# Patient Record
Sex: MALE | Race: WHITE
[De-identification: names, ages, dates, MRNs, and addresses within clinical notes are randomized per-mention and may not be internally consistent; named-entity substitution may affect disease eponyms.]

---

## 2020-01-15 ENCOUNTER — TRANSCRIPTION ENCOUNTER (OUTPATIENT)
Age: 70
End: 2020-01-15

## 2020-01-15 ENCOUNTER — RESULT REVIEW (OUTPATIENT)
Age: 70
End: 2020-01-15

## 2020-01-15 ENCOUNTER — OUTPATIENT (OUTPATIENT)
Dept: OUTPATIENT SERVICES | Facility: HOSPITAL | Age: 70
LOS: 1 days | Discharge: HOME | End: 2020-01-15

## 2020-01-15 VITALS — RESPIRATION RATE: 17 BRPM | HEART RATE: 94 BPM | DIASTOLIC BLOOD PRESSURE: 75 MMHG | SYSTOLIC BLOOD PRESSURE: 119 MMHG

## 2020-01-15 VITALS
TEMPERATURE: 98 F | RESPIRATION RATE: 18 BRPM | DIASTOLIC BLOOD PRESSURE: 82 MMHG | HEART RATE: 108 BPM | SYSTOLIC BLOOD PRESSURE: 129 MMHG | WEIGHT: 147.05 LBS | HEIGHT: 64 IN

## 2020-01-15 DIAGNOSIS — K51.80 OTHER ULCERATIVE COLITIS WITHOUT COMPLICATIONS: ICD-10-CM

## 2020-01-15 DIAGNOSIS — Z87.2 PERSONAL HISTORY OF DISEASES OF THE SKIN AND SUBCUTANEOUS TISSUE: Chronic | ICD-10-CM

## 2020-01-15 DIAGNOSIS — Z90.49 ACQUIRED ABSENCE OF OTHER SPECIFIED PARTS OF DIGESTIVE TRACT: Chronic | ICD-10-CM

## 2020-01-20 LAB — SURGICAL PATHOLOGY STUDY: SIGNIFICANT CHANGE UP

## 2020-01-23 DIAGNOSIS — K52.9 NONINFECTIVE GASTROENTERITIS AND COLITIS, UNSPECIFIED: ICD-10-CM

## 2020-01-23 DIAGNOSIS — R19.7 DIARRHEA, UNSPECIFIED: ICD-10-CM

## 2020-01-23 DIAGNOSIS — K64.1 SECOND DEGREE HEMORRHOIDS: ICD-10-CM

## 2020-01-23 DIAGNOSIS — Z90.49 ACQUIRED ABSENCE OF OTHER SPECIFIED PARTS OF DIGESTIVE TRACT: ICD-10-CM

## 2020-01-23 DIAGNOSIS — D12.4 BENIGN NEOPLASM OF DESCENDING COLON: ICD-10-CM

## 2020-01-23 DIAGNOSIS — I10 ESSENTIAL (PRIMARY) HYPERTENSION: ICD-10-CM

## 2022-05-11 PROBLEM — I10 ESSENTIAL (PRIMARY) HYPERTENSION: Chronic | Status: ACTIVE | Noted: 2020-01-15

## 2022-05-11 PROBLEM — K21.9 GASTRO-ESOPHAGEAL REFLUX DISEASE WITHOUT ESOPHAGITIS: Chronic | Status: ACTIVE | Noted: 2020-01-15

## 2022-05-11 PROBLEM — K52.9 NONINFECTIVE GASTROENTERITIS AND COLITIS, UNSPECIFIED: Chronic | Status: ACTIVE | Noted: 2020-01-15

## 2022-05-18 ENCOUNTER — OUTPATIENT (OUTPATIENT)
Dept: OUTPATIENT SERVICES | Facility: HOSPITAL | Age: 72
LOS: 1 days | Discharge: HOME | End: 2022-05-18
Payer: COMMERCIAL

## 2022-05-18 DIAGNOSIS — R10.9 UNSPECIFIED ABDOMINAL PAIN: ICD-10-CM

## 2022-05-18 DIAGNOSIS — Z87.2 PERSONAL HISTORY OF DISEASES OF THE SKIN AND SUBCUTANEOUS TISSUE: Chronic | ICD-10-CM

## 2022-05-18 DIAGNOSIS — Z90.49 ACQUIRED ABSENCE OF OTHER SPECIFIED PARTS OF DIGESTIVE TRACT: Chronic | ICD-10-CM

## 2022-05-18 PROCEDURE — 74176 CT ABD & PELVIS W/O CONTRAST: CPT | Mod: 26

## 2022-11-14 ENCOUNTER — INPATIENT (INPATIENT)
Facility: HOSPITAL | Age: 72
LOS: 9 days | Discharge: HOME | End: 2022-11-24
Attending: HOSPITALIST | Admitting: HOSPITALIST

## 2022-11-14 VITALS
TEMPERATURE: 96 F | WEIGHT: 141.1 LBS | HEART RATE: 50 BPM | SYSTOLIC BLOOD PRESSURE: 206 MMHG | RESPIRATION RATE: 18 BRPM | OXYGEN SATURATION: 99 % | HEIGHT: 64 IN | DIASTOLIC BLOOD PRESSURE: 89 MMHG

## 2022-11-14 DIAGNOSIS — I16.1 HYPERTENSIVE EMERGENCY: ICD-10-CM

## 2022-11-14 DIAGNOSIS — Z87.2 PERSONAL HISTORY OF DISEASES OF THE SKIN AND SUBCUTANEOUS TISSUE: Chronic | ICD-10-CM

## 2022-11-14 DIAGNOSIS — I10 ESSENTIAL (PRIMARY) HYPERTENSION: ICD-10-CM

## 2022-11-14 DIAGNOSIS — Z90.49 ACQUIRED ABSENCE OF OTHER SPECIFIED PARTS OF DIGESTIVE TRACT: ICD-10-CM

## 2022-11-14 DIAGNOSIS — R74.01 ELEVATION OF LEVELS OF LIVER TRANSAMINASE LEVELS: ICD-10-CM

## 2022-11-14 DIAGNOSIS — E83.42 HYPOMAGNESEMIA: ICD-10-CM

## 2022-11-14 DIAGNOSIS — Z20.822 CONTACT WITH AND (SUSPECTED) EXPOSURE TO COVID-19: ICD-10-CM

## 2022-11-14 DIAGNOSIS — K21.9 GASTRO-ESOPHAGEAL REFLUX DISEASE WITHOUT ESOPHAGITIS: ICD-10-CM

## 2022-11-14 DIAGNOSIS — K51.90 ULCERATIVE COLITIS, UNSPECIFIED, WITHOUT COMPLICATIONS: ICD-10-CM

## 2022-11-14 DIAGNOSIS — K83.1 OBSTRUCTION OF BILE DUCT: ICD-10-CM

## 2022-11-14 DIAGNOSIS — Z90.49 ACQUIRED ABSENCE OF OTHER SPECIFIED PARTS OF DIGESTIVE TRACT: Chronic | ICD-10-CM

## 2022-11-14 LAB
ALBUMIN SERPL ELPH-MCNC: 3.9 G/DL — SIGNIFICANT CHANGE UP (ref 3.5–5.2)
ALBUMIN SERPL ELPH-MCNC: 4 G/DL — SIGNIFICANT CHANGE UP (ref 3.5–5.2)
ALP SERPL-CCNC: 449 U/L — HIGH (ref 30–115)
ALP SERPL-CCNC: 457 U/L — HIGH (ref 30–115)
ALT FLD-CCNC: 512 U/L — HIGH (ref 0–41)
ALT FLD-CCNC: 529 U/L — HIGH (ref 0–41)
ANION GAP SERPL CALC-SCNC: 10 MMOL/L — SIGNIFICANT CHANGE UP (ref 7–14)
AST SERPL-CCNC: 560 U/L — HIGH (ref 0–41)
AST SERPL-CCNC: 594 U/L — HIGH (ref 0–41)
BASOPHILS # BLD AUTO: 0.03 K/UL — SIGNIFICANT CHANGE UP (ref 0–0.2)
BASOPHILS NFR BLD AUTO: 0.4 % — SIGNIFICANT CHANGE UP (ref 0–1)
BILIRUB DIRECT SERPL-MCNC: 0.4 MG/DL — HIGH (ref 0–0.3)
BILIRUB INDIRECT FLD-MCNC: 1.6 MG/DL — HIGH (ref 0.2–1.2)
BILIRUB SERPL-MCNC: 1.9 MG/DL — HIGH (ref 0.2–1.2)
BILIRUB SERPL-MCNC: 2 MG/DL — HIGH (ref 0.2–1.2)
BUN SERPL-MCNC: 10 MG/DL — SIGNIFICANT CHANGE UP (ref 10–20)
CALCIUM SERPL-MCNC: 9.3 MG/DL — SIGNIFICANT CHANGE UP (ref 8.4–10.4)
CHLORIDE SERPL-SCNC: 99 MMOL/L — SIGNIFICANT CHANGE UP (ref 98–110)
CO2 SERPL-SCNC: 23 MMOL/L — SIGNIFICANT CHANGE UP (ref 17–32)
CREAT SERPL-MCNC: 1 MG/DL — SIGNIFICANT CHANGE UP (ref 0.7–1.5)
EGFR: 80 ML/MIN/1.73M2 — SIGNIFICANT CHANGE UP
EOSINOPHIL # BLD AUTO: 0.56 K/UL — SIGNIFICANT CHANGE UP (ref 0–0.7)
EOSINOPHIL NFR BLD AUTO: 6.9 % — SIGNIFICANT CHANGE UP (ref 0–8)
GLUCOSE SERPL-MCNC: 98 MG/DL — SIGNIFICANT CHANGE UP (ref 70–99)
HCT VFR BLD CALC: 47.1 % — SIGNIFICANT CHANGE UP (ref 42–52)
HGB BLD-MCNC: 16.6 G/DL — SIGNIFICANT CHANGE UP (ref 14–18)
IMM GRANULOCYTES NFR BLD AUTO: 0.5 % — HIGH (ref 0.1–0.3)
LACTATE SERPL-SCNC: 1.1 MMOL/L — SIGNIFICANT CHANGE UP (ref 0.7–2)
LIDOCAIN IGE QN: 57 U/L — SIGNIFICANT CHANGE UP (ref 7–60)
LYMPHOCYTES # BLD AUTO: 1.09 K/UL — LOW (ref 1.2–3.4)
LYMPHOCYTES # BLD AUTO: 13.4 % — LOW (ref 20.5–51.1)
MCHC RBC-ENTMCNC: 30.6 PG — SIGNIFICANT CHANGE UP (ref 27–31)
MCHC RBC-ENTMCNC: 35.2 G/DL — SIGNIFICANT CHANGE UP (ref 32–37)
MCV RBC AUTO: 86.9 FL — SIGNIFICANT CHANGE UP (ref 80–94)
MONOCYTES # BLD AUTO: 0.72 K/UL — HIGH (ref 0.1–0.6)
MONOCYTES NFR BLD AUTO: 8.9 % — SIGNIFICANT CHANGE UP (ref 1.7–9.3)
NEUTROPHILS # BLD AUTO: 5.68 K/UL — SIGNIFICANT CHANGE UP (ref 1.4–6.5)
NEUTROPHILS NFR BLD AUTO: 69.9 % — SIGNIFICANT CHANGE UP (ref 42.2–75.2)
NRBC # BLD: 0 /100 WBCS — SIGNIFICANT CHANGE UP (ref 0–0)
NT-PROBNP SERPL-SCNC: 248 PG/ML — SIGNIFICANT CHANGE UP (ref 0–300)
PLATELET # BLD AUTO: 258 K/UL — SIGNIFICANT CHANGE UP (ref 130–400)
POTASSIUM SERPL-MCNC: SIGNIFICANT CHANGE UP MMOL/L (ref 3.5–5)
POTASSIUM SERPL-SCNC: SIGNIFICANT CHANGE UP MMOL/L (ref 3.5–5)
PROT SERPL-MCNC: 6.9 G/DL — SIGNIFICANT CHANGE UP (ref 6–8)
PROT SERPL-MCNC: 7.6 G/DL — SIGNIFICANT CHANGE UP (ref 6–8)
RBC # BLD: 5.42 M/UL — SIGNIFICANT CHANGE UP (ref 4.7–6.1)
RBC # FLD: 13.4 % — SIGNIFICANT CHANGE UP (ref 11.5–14.5)
SARS-COV-2 RNA SPEC QL NAA+PROBE: SIGNIFICANT CHANGE UP
SODIUM SERPL-SCNC: 132 MMOL/L — LOW (ref 135–146)
TROPONIN T SERPL-MCNC: <0.01 NG/ML — SIGNIFICANT CHANGE UP
WBC # BLD: 8.12 K/UL — SIGNIFICANT CHANGE UP (ref 4.8–10.8)
WBC # FLD AUTO: 8.12 K/UL — SIGNIFICANT CHANGE UP (ref 4.8–10.8)

## 2022-11-14 PROCEDURE — 70450 CT HEAD/BRAIN W/O DYE: CPT | Mod: 26,MA

## 2022-11-14 PROCEDURE — 74177 CT ABD & PELVIS W/CONTRAST: CPT | Mod: 26,MA

## 2022-11-14 PROCEDURE — 99285 EMERGENCY DEPT VISIT HI MDM: CPT

## 2022-11-14 PROCEDURE — 93010 ELECTROCARDIOGRAM REPORT: CPT

## 2022-11-14 RX ORDER — SODIUM CHLORIDE 9 MG/ML
1000 INJECTION, SOLUTION INTRAVENOUS ONCE
Refills: 0 | Status: COMPLETED | OUTPATIENT
Start: 2022-11-14 | End: 2022-11-14

## 2022-11-14 RX ADMIN — SODIUM CHLORIDE 1000 MILLILITER(S): 9 INJECTION, SOLUTION INTRAVENOUS at 21:48

## 2022-11-14 NOTE — ED PROVIDER NOTE - NS ED ROS FT
Constitutional: (-) fever  Eyes/ENT: (-) blurry vision, (-) epistaxis  Cardiovascular: (-) chest pain, (-) syncope (+) elevated BP  Respiratory: (-) cough, (-) shortness of breath  Gastrointestinal: (-) abdominal pain (-) nausea (-) vomiting, (-) diarrhea  Musculoskeletal: (-) neck pain, (-) back pain, (-) joint pain  Integumentary: (-) rash, (-) edema  Neurological: (+) headache, (-) altered mental status  Psychiatric: (-) hallucinations  Allergic/Immunologic: (-) pruritus

## 2022-11-14 NOTE — ED PROVIDER NOTE - NS ED ATTENDING STATEMENT MOD
This was a shared visit with the JENNIFFER. I reviewed and verified the documentation and independently performed the documented:

## 2022-11-14 NOTE — ED PROVIDER NOTE - PRINCIPAL DIAGNOSIS
Transaminitis Complex Repair And Double Advancement Flap Text: The defect edges were debeveled with a #15 scalpel blade.  The primary defect was closed partially with a complex linear closure.  Given the location of the remaining defect, shape of the defect and the proximity to free margins a double advancement flap was deemed most appropriate for complete closure of the defect.  Using a sterile surgical marker, an appropriate advancement flap was drawn incorporating the defect and placing the expected incisions within the relaxed skin tension lines where possible.    The area thus outlined was incised deep to adipose tissue with a #15 scalpel blade.  The skin margins were undermined to an appropriate distance in all directions utilizing iris scissors.

## 2022-11-14 NOTE — ED ADULT TRIAGE NOTE - NSWEIGHTCALCTOOLDRUG_GEN_A_CORE
Nursing Note by Silvana Perry CMA at 01/15/18 10:33 AM     Author:  Silvana Perry CMA Service:  (none) Author Type:  Certified Medical Assistant     Filed:  01/15/18 10:33 AM Encounter Date:  1/15/2018 Status:  Signed     :  Silvana Perry CMA (Certified Medical Assistant)            If the provider orders any diagnostic testing at today's visit, the patient would prefer to be contacted by means of[LB1.1T] mom Veridiana's cell[LB1.1M].  If by phone their preferred phone number is[LB1.1T] [phone][LB1.1M] and it is[LB1.1T] ok[LB1.1M] to leave a detailed message on their voicemail or with a family member.  Patient is aware that if they are my chart active most of their test results will be auto released once the provider has viewed them.[LB1.1T]        Revision History        User Key Date/Time User Provider Type Action    > LB1.1 01/15/18 10:33 AM Silvana Perry CMA Certified Medical Assistant Sign    M - Manual, T - Template            
 used

## 2022-11-14 NOTE — ED PROVIDER NOTE - ATTENDING APP SHARED VISIT CONTRIBUTION OF CARE
72-year-old male history of hypertension ulcerative colitis, cholecystectomy, colon resection presenting for evaluation of hypertension.  States that he has noted intermittent high blood pressure since Saturday associated with headache.  Had 1 episode of nausea/vomiting.  No other acute complaints.  No abdominal pain.  Chronically ill appearing, mildly jaundiced non toxic. NCAT PERRLA EOMI neck supple non tender normal wob cta bl rrr abdomen s nt nd no rebound no guarding WWPx4 neuro non focal

## 2022-11-14 NOTE — ED PROVIDER NOTE - CLINICAL SUMMARY MEDICAL DECISION MAKING FREE TEXT BOX
72-year-old male history of hypertension ulcerative colitis, cholecystectomy, colon resection presenting for evaluation of hypertension.  States that he has noted intermittent high blood pressure since Saturday associated with headache.  Had 1 episode of nausea/vomiting.  No other acute complaints.  No abdominal pain.  Chronically ill appearing, mildly jaundiced non toxic. NCAT PERRLA EOMI neck supple non tender normal wob cta bl rrr abdomen s nt nd no rebound no guarding WWPx4 neuro non focal. labs ekg imaging reviewed. afsane[facility] GI, will admit for further eval

## 2022-11-14 NOTE — ED PROVIDER NOTE - OBJECTIVE STATEMENT
72 year old male with a history of HTN and Ulcerative colitis presents with elevated blood pressure. Patient states that he is on Metoprolol 25mg BID and Quinapril 20mg BID which he is compliant with. Since Saturday evening his blood pressure has been elevated with SBP 170s associated with headache, nausea, and vomiting. At this time patient has mild headache but he has not vomited since Saturday night and is not nauseous at this time. Further denies vision changes, chest pain, shortness of breath, abdominal pain.

## 2022-11-15 LAB
ALBUMIN SERPL ELPH-MCNC: 3.9 G/DL — SIGNIFICANT CHANGE UP (ref 3.5–5.2)
ALP SERPL-CCNC: 553 U/L — HIGH (ref 30–115)
ALT FLD-CCNC: 500 U/L — HIGH (ref 0–41)
ANION GAP SERPL CALC-SCNC: 11 MMOL/L — SIGNIFICANT CHANGE UP (ref 7–14)
APTT BLD: 27.6 SEC — SIGNIFICANT CHANGE UP (ref 27–39.2)
AST SERPL-CCNC: 374 U/L — HIGH (ref 0–41)
BASOPHILS # BLD AUTO: 0.03 K/UL — SIGNIFICANT CHANGE UP (ref 0–0.2)
BASOPHILS NFR BLD AUTO: 0.4 % — SIGNIFICANT CHANGE UP (ref 0–1)
BILIRUB SERPL-MCNC: 2.2 MG/DL — HIGH (ref 0.2–1.2)
BUN SERPL-MCNC: 9 MG/DL — LOW (ref 10–20)
CALCIUM SERPL-MCNC: 9 MG/DL — SIGNIFICANT CHANGE UP (ref 8.4–10.5)
CHLORIDE SERPL-SCNC: 102 MMOL/L — SIGNIFICANT CHANGE UP (ref 98–110)
CO2 SERPL-SCNC: 26 MMOL/L — SIGNIFICANT CHANGE UP (ref 17–32)
CREAT SERPL-MCNC: 0.9 MG/DL — SIGNIFICANT CHANGE UP (ref 0.7–1.5)
EGFR: 91 ML/MIN/1.73M2 — SIGNIFICANT CHANGE UP
EOSINOPHIL # BLD AUTO: 0.51 K/UL — SIGNIFICANT CHANGE UP (ref 0–0.7)
EOSINOPHIL NFR BLD AUTO: 7.3 % — SIGNIFICANT CHANGE UP (ref 0–8)
GGT SERPL-CCNC: 431 U/L — HIGH (ref 1–40)
GLUCOSE SERPL-MCNC: 119 MG/DL — HIGH (ref 70–99)
HCT VFR BLD CALC: 45.7 % — SIGNIFICANT CHANGE UP (ref 42–52)
HGB BLD-MCNC: 15.7 G/DL — SIGNIFICANT CHANGE UP (ref 14–18)
IMM GRANULOCYTES NFR BLD AUTO: 0.4 % — HIGH (ref 0.1–0.3)
INR BLD: 1.03 RATIO — SIGNIFICANT CHANGE UP (ref 0.65–1.3)
LYMPHOCYTES # BLD AUTO: 0.95 K/UL — LOW (ref 1.2–3.4)
LYMPHOCYTES # BLD AUTO: 13.5 % — LOW (ref 20.5–51.1)
MAGNESIUM SERPL-MCNC: 1.8 MG/DL — SIGNIFICANT CHANGE UP (ref 1.8–2.4)
MCHC RBC-ENTMCNC: 30.4 PG — SIGNIFICANT CHANGE UP (ref 27–31)
MCHC RBC-ENTMCNC: 34.4 G/DL — SIGNIFICANT CHANGE UP (ref 32–37)
MCV RBC AUTO: 88.4 FL — SIGNIFICANT CHANGE UP (ref 80–94)
MONOCYTES # BLD AUTO: 0.64 K/UL — HIGH (ref 0.1–0.6)
MONOCYTES NFR BLD AUTO: 9.1 % — SIGNIFICANT CHANGE UP (ref 1.7–9.3)
NEUTROPHILS # BLD AUTO: 4.87 K/UL — SIGNIFICANT CHANGE UP (ref 1.4–6.5)
NEUTROPHILS NFR BLD AUTO: 69.3 % — SIGNIFICANT CHANGE UP (ref 42.2–75.2)
NRBC # BLD: 0 /100 WBCS — SIGNIFICANT CHANGE UP (ref 0–0)
PLATELET # BLD AUTO: 268 K/UL — SIGNIFICANT CHANGE UP (ref 130–400)
POTASSIUM SERPL-MCNC: 3.3 MMOL/L — LOW (ref 3.5–5)
POTASSIUM SERPL-SCNC: 3.3 MMOL/L — LOW (ref 3.5–5)
PROT SERPL-MCNC: 6.9 G/DL — SIGNIFICANT CHANGE UP (ref 6–8)
PROTHROM AB SERPL-ACNC: 11.7 SEC — SIGNIFICANT CHANGE UP (ref 9.95–12.87)
RBC # BLD: 5.17 M/UL — SIGNIFICANT CHANGE UP (ref 4.7–6.1)
RBC # FLD: 13.2 % — SIGNIFICANT CHANGE UP (ref 11.5–14.5)
SODIUM SERPL-SCNC: 139 MMOL/L — SIGNIFICANT CHANGE UP (ref 135–146)
WBC # BLD: 7.03 K/UL — SIGNIFICANT CHANGE UP (ref 4.8–10.8)
WBC # FLD AUTO: 7.03 K/UL — SIGNIFICANT CHANGE UP (ref 4.8–10.8)

## 2022-11-15 PROCEDURE — 76705 ECHO EXAM OF ABDOMEN: CPT | Mod: 26

## 2022-11-15 PROCEDURE — 99233 SBSQ HOSP IP/OBS HIGH 50: CPT

## 2022-11-15 PROCEDURE — 99221 1ST HOSP IP/OBS SF/LOW 40: CPT

## 2022-11-15 PROCEDURE — 99223 1ST HOSP IP/OBS HIGH 75: CPT

## 2022-11-15 RX ORDER — PANTOPRAZOLE SODIUM 20 MG/1
40 TABLET, DELAYED RELEASE ORAL
Refills: 0 | Status: DISCONTINUED | OUTPATIENT
Start: 2022-11-15 | End: 2022-11-24

## 2022-11-15 RX ORDER — MESALAMINE 400 MG
2 TABLET, DELAYED RELEASE (ENTERIC COATED) ORAL
Qty: 0 | Refills: 0 | DISCHARGE

## 2022-11-15 RX ORDER — ENOXAPARIN SODIUM 100 MG/ML
40 INJECTION SUBCUTANEOUS ONCE
Refills: 0 | Status: COMPLETED | OUTPATIENT
Start: 2022-11-15 | End: 2022-11-15

## 2022-11-15 RX ORDER — MESALAMINE 400 MG
4800 TABLET, DELAYED RELEASE (ENTERIC COATED) ORAL DAILY
Refills: 0 | Status: DISCONTINUED | OUTPATIENT
Start: 2022-11-15 | End: 2022-11-16

## 2022-11-15 RX ORDER — POTASSIUM CHLORIDE 20 MEQ
20 PACKET (EA) ORAL
Refills: 0 | Status: COMPLETED | OUTPATIENT
Start: 2022-11-15 | End: 2022-11-15

## 2022-11-15 RX ORDER — AZATHIOPRINE 100 MG/1
50 TABLET ORAL DAILY
Refills: 0 | Status: DISCONTINUED | OUTPATIENT
Start: 2022-11-15 | End: 2022-11-24

## 2022-11-15 RX ORDER — HYDRALAZINE HCL 50 MG
25 TABLET ORAL ONCE
Refills: 0 | Status: COMPLETED | OUTPATIENT
Start: 2022-11-15 | End: 2022-11-15

## 2022-11-15 RX ORDER — ESCITALOPRAM OXALATE 10 MG/1
10 TABLET, FILM COATED ORAL DAILY
Refills: 0 | Status: DISCONTINUED | OUTPATIENT
Start: 2022-11-15 | End: 2022-11-24

## 2022-11-15 RX ORDER — ATORVASTATIN CALCIUM 80 MG/1
20 TABLET, FILM COATED ORAL AT BEDTIME
Refills: 0 | Status: DISCONTINUED | OUTPATIENT
Start: 2022-11-15 | End: 2022-11-24

## 2022-11-15 RX ORDER — METOPROLOL TARTRATE 50 MG
25 TABLET ORAL DAILY
Refills: 0 | Status: DISCONTINUED | OUTPATIENT
Start: 2022-11-15 | End: 2022-11-24

## 2022-11-15 RX ORDER — LISINOPRIL 2.5 MG/1
20 TABLET ORAL DAILY
Refills: 0 | Status: DISCONTINUED | OUTPATIENT
Start: 2022-11-15 | End: 2022-11-24

## 2022-11-15 RX ORDER — LABETALOL HCL 100 MG
10 TABLET ORAL ONCE
Refills: 0 | Status: COMPLETED | OUTPATIENT
Start: 2022-11-15 | End: 2022-11-15

## 2022-11-15 RX ADMIN — Medication 25 MILLIGRAM(S): at 21:47

## 2022-11-15 RX ADMIN — ATORVASTATIN CALCIUM 20 MILLIGRAM(S): 80 TABLET, FILM COATED ORAL at 21:33

## 2022-11-15 RX ADMIN — Medication 25 MILLIGRAM(S): at 05:43

## 2022-11-15 RX ADMIN — Medication 20 MILLIEQUIVALENT(S): at 15:34

## 2022-11-15 RX ADMIN — LISINOPRIL 20 MILLIGRAM(S): 2.5 TABLET ORAL at 05:40

## 2022-11-15 RX ADMIN — ENOXAPARIN SODIUM 40 MILLIGRAM(S): 100 INJECTION SUBCUTANEOUS at 05:43

## 2022-11-15 RX ADMIN — Medication 20 MILLIEQUIVALENT(S): at 17:35

## 2022-11-15 RX ADMIN — Medication 10 MILLIGRAM(S): at 17:48

## 2022-11-15 RX ADMIN — PANTOPRAZOLE SODIUM 40 MILLIGRAM(S): 20 TABLET, DELAYED RELEASE ORAL at 06:31

## 2022-11-15 RX ADMIN — Medication 20 MILLIEQUIVALENT(S): at 21:32

## 2022-11-15 NOTE — PATIENT PROFILE ADULT - NS PRO AD NO ADVANCE DIRECTIVE
SUBJECTIVE:  Virgilio Forbes is coming in for annual exam.    Virgilio is been having problems with his back.  He said whenever he is golfing his back tightens up and he gets pain down his right leg in a sciatic nerve distribution.  He seems to have very little other onset of his sciatic except when he is golfing.    He has history of hypertension and was concerned about his blood pressure.    He has a history of atherosclerotic heart disease based on a cardiac calcium score from 2016.  He is on statins and has had no chest pain or anginal equivalent symptoms.      I reviewed his medical, family, surgical and social history  I reviewed his meds.      He sleeping well.  He has had no fevers, sore throat, cough or congestion.    He has no nocturia.    He has no change in bowel pattern.    He has no visual change, headaches, anxiety or depressive symptoms    OBJECTIVE:  Vital signs reviewed.  Nursing notes reviewed.  Cardiac exam shows a regular rate and rhythm without murmur or gallop.  Lungs clear without wheezing or rales.  No carotid bruits.  Good pulses without edema lower extremities.  Alert awake oriented x3 without speech or motor impairment    ASSESSMENT:  1. Annual physical exam   2. Atherosclerotic heart disease  3. Lumbar disc disease L 5 S1     PLAN:  I think his sciatic pain is manageable at this point without further intervention.  Home core muscle strengthening discussed.    Lab work ordered I will call with results.    Strong recommendation to repeat his cardiac calcium score which is now 6 years old.    Colonoscopy discussed.    Immunotherapy reviewed    
Virgilio Forbes is a 51 year old male  Denies known Latex allergy or symptoms of Latex sensitivity.  Allergies reviewed.   Medications verified and changes made with Patient's assistance.  Prescription benefits verified.  Preferred Pharmacy verified.  Weight taken with shoes ON    Advanced Directives on file ? No      Tobacco History reviewed & updated with Pt.  Pt wearing mask: Yes   PPE worn during encounter -Surgical mask, Face shield.     Patient 's reason for appointment is: Blood Pressure (Check up )      Health Maintenance Due   Topic Date Due   • Colorectal Cancer Screen-  Never done   • Shingles Vaccine (1 of 2) Never done   • Depression Screening  07/24/2021   • COVID-19 Vaccine (4 - Booster for Pfizer series) 03/21/2022       Patient is due for the topics as listed above and wishes to proceed with them.   Patient would like communication of their results via:      Cell Phone:   Telephone Information:   Mobile [phone]     Okay to leave a message containing results? Yes        
No

## 2022-11-15 NOTE — CONSULT NOTE ADULT - NS ATTEND AMEND GEN_ALL_CORE FT
73 yo M elevated LFTs, in the setting of hypertensive crisis. Please obtain US and CLD work up. Rule out PSC in the setting of UC. Suggest OP MRCP

## 2022-11-15 NOTE — H&P ADULT - NSHPLABSRESULTS_GEN_ALL_CORE
16.6   8.12  )-----------( 258      ( 14 Nov 2022 20:17 )             47.1       11-14    132<L>  |  99  |  10  ----------------------------<  98  tnp   |  23  |  1.0    Ca    9.3      14 Nov 2022 20:17    TPro  6.9  /  Alb  4.0  /  TBili  2.0<H>  /  DBili  0.4<H>  /  AST  560<H>  /  ALT  512<H>  /  AlkPhos  457<H>  11-14

## 2022-11-15 NOTE — PROGRESS NOTE ADULT - SUBJECTIVE AND OBJECTIVE BOX
SUBJECTIVE:    Patient is a 72y old Male who presents with a chief complaint of   Currently admitted to medicine with the primary diagnosis of Transaminitis     Today is hospital day . This morning he is resting comfortably in bed and reports no new issues or overnight events.   still complaining of mild headache but has not vomited since admission to hospital.    PAST MEDICAL & SURGICAL HISTORY  HTN (hypertension)    GERD (gastroesophageal reflux disease)    Colitis    H/O pilonidal cyst    History of colon resection      SOCIAL HISTORY:  Negative for smoking/alcohol/drug use.     ALLERGIES:  No Known Allergies    MEDICATIONS:  STANDING MEDICATIONS  atorvastatin 20 milliGRAM(s) Oral at bedtime  azaTHIOprine 50 milliGRAM(s) Oral daily  escitalopram 10 milliGRAM(s) Oral daily  lisinopril 20 milliGRAM(s) Oral daily  mesalamine ER Capsule 4800 milliGRAM(s) Oral daily  metoprolol succinate ER 25 milliGRAM(s) Oral daily  pantoprazole    Tablet 40 milliGRAM(s) Oral before breakfast    PRN MEDICATIONS    VITALS:   T(F): 98.6  HR: 77  BP: 148/68  RR: 18  SpO2: 98%    LABS:                        15.7   7.03  )-----------( 268      ( 15 Nov 2022 12:14 )             45.7     11-15    139  |  102  |  9<L>  ----------------------------<  119<H>  3.3<L>   |  26  |  0.9    Ca    9.0      15 Nov 2022 12:14  Mg     1.8     11-15    TPro  6.9  /  Alb  3.9  /  TBili  2.2<H>  /  DBili  x   /  AST  374<H>  /  ALT  500<H>  /  AlkPhos  553<H>  11-15    PT/INR - ( 15 Nov 2022 12:14 )   PT: 11.70 sec;   INR: 1.03 ratio         PTT - ( 15 Nov 2022 12:14 )  PTT:27.6 sec      Lactate, Blood: 1.1 mmol/L (11-14-22 @ 21:39)  Troponin T, Serum: <0.01 ng/mL (11-14-22 @ 20:17)      CARDIAC MARKERS ( 14 Nov 2022 20:17 )  x     / <0.01 ng/mL / x     / x     / x          RADIOLOGY:    PHYSICAL EXAM:  GEN: No acute distress  LUNGS: Clear to auscultation bilaterally   HEART: Regular s1ss  ABD: Soft, non-tender, non-distended.  EXT: NC/NC/NE/2+PP/PRICE/Skin Intact.   NEURO: AAOX3    Intravenous access:   NG tube:   Morel Catheter:

## 2022-11-15 NOTE — H&P ADULT - HISTORY OF PRESENT ILLNESS
73 y/o M with HTN, ulcerative colitis, cholecystectomy, presents with elevated blood pressure. Two days ago pt started to have headaches and nausea/vomiting which he attributes to elevated blood pressure, states that highest it was at home was 160, wouldn't come down for two days, got worried and came to the ED. Pt attributes his n/v to elevated blood pressure, states that he never had abdominal pain, the n/vstopped yestrday.   Of note, pt states that he routinely gets abdominal ultrasounds by his doctor, most recent a few weeks ago and they told him he had some gallstones. Pt had a cholecystectomy many years ago.   Pt denies alcohol use.     In the ED, pt given fluids  Vitals: Vital Signs Last 24 Hrs  T(C): 36.4 (15 Nov 2022 02:28), Max: 36.9 (15 Nov 2022 00:38)  T(F): 97.5 (15 Nov 2022 02:28), Max: 98.5 (15 Nov 2022 00:38)  HR: 72 (15 Nov 2022 02:28) (50 - 73)  BP: 193/80 (15 Nov 2022 02:28) (154/76 - 206/89)  BP(mean): --  RR: 18 (15 Nov 2022 02:28) (18 - 20)  SpO2: 98% (15 Nov 2022 02:28) (98% - 99%)    Parameters below as of 15 Nov 2022 02:28  Patient On (Oxygen Delivery Method): room air    Labs: significant for , , T bili 2, D bili .4, alk phos 457    Imaging:   < from: CT Abdomen and Pelvis w/ IV Cont (11.14.22 @ 22:44) >  IMPRESSION:  1.  No evidence of acute abdominopelvic pathology.    < end of copied text >  < from: CT Head No Cont (11.14.22 @ 20:52) >  IMPRESSION:    1. No evidence of acute intracranial hemorrhage or mass effect.    2. Mild chronic microvascular ischemic changes.    < end of copied text >    Pt admitted for transaminitis

## 2022-11-15 NOTE — H&P ADULT - ATTENDING COMMENTS
***My note supersedes any discrepancies that may be above in the resident's note***    71 y/o M with HTN, ulcerative colitis, cholecystectomy, presents with symptomatic hypertension, concerning for hypertensive emergency    # HTN emergency  - severe headache with onset of HTN  - blood pressure resolved after receiving home meds  - unsure why BP accelerated as patient states he was compliant with his home medications, did not miss dose; no identifying aggrevating factors ie stress, uncontrolled pain, etc  -at home on quinapril 20mg; 1:1 conversion to lisinopril. will start lisinopril 20mg daily inpatient  -c/w metoprolol succinate 25mg daily  - monitor on serial vital checks  - if urgent/emergently elevated: labetalol 10mg IV(if HR >60) OR Hydralazine 10mg IV(if HR <60)    #Transaminitis  # choledocholithasis  - unsure etiology  - stones present in duct along with elevated alk phos, however, bili is mostly indirect  -, , T bili 2, D bili .4, alk phos 457  -did have n/v yesterday which resolved, never had abdominal pain, currently asymptomatic  -Pt had cholcystectomy many years ago, notes that he recently had outpatient US which showed gallstones  -CT abd negative  -RUQ sono pending  -Acute hepatitis panel  -Advanced GI eval pending    #Ulcerative colitis  #Hx of colonic resection  -Pt on mesalamine at home; awaiting wife to bring med list to confirm    MED REC: pt does not remember all his meds, verify with wife in am   DVT ppx: lovenox  GI ppx: ppi  Diet: dash  Dispo: from home    #Progress Note Handoff  Pending (specify):  BP monitoring, RUQ US, GI consult, LFT trends  Family discussion: updated patient at bedside  Disposition:  Unknown at this time________

## 2022-11-15 NOTE — H&P ADULT - NSICDXPASTMEDICALHX_GEN_ALL_CORE_FT
PAST MEDICAL HISTORY:  Colitis     GERD (gastroesophageal reflux disease)     HTN (hypertension)

## 2022-11-15 NOTE — CONSULT NOTE ADULT - ASSESSMENT
Patient is a 73 y/o male with PMHx of HTN, ulcerative colitis (, Prior Resection) , cholecystectomy, presents with elevated blood pressure. Patient noted started to have headaches and nausea/vomiting which he attributes to elevated blood pressure, states that highest it was at home was 160, wouldn't come down for two days, got worried and came to the ED. Patient feels better since admission. Notes no current pain and tolerating diet. Follows Dr. Mendoza and last CRC was in 2020, and inflammation was in the left colon. Exam reassuring, CT scan without acute intraabdominal process. All of the serologies ordered for 11am today are appropriate and we agree with. Obtain Abdomina U/S to evaluate CBD.     Abnormal LFT/ UC Hx  - Agree with serologies ordered for 11am today  - Obtain Abdominal U/S to check CBD  - Exam reassuring and BP better controlled   - Will follow  - Encouraged follow up with Dr. Mendoza upon discharge  Patient is a 71 y/o male with PMHx of HTN, ulcerative colitis (, Prior Resection) , cholecystectomy, presents with elevated blood pressure. Patient noted started to have headaches and nausea/vomiting which he attributes to elevated blood pressure, states that highest it was at home was 160, wouldn't come down for two days, got worried and came to the ED. Patient feels better since admission. Notes no current pain and tolerating diet. Follows Dr. Mendoza and last CRC was in 2020, and inflammation was in the left colon. Exam reassuring, CT scan without acute intraabdominal process. All of the serologies ordered for 11am today are appropriate and we agree with. Obtain Abdomina U/S to evaluate CBD.     Abnormal LFT/ UC Hx  - Agree with serologies ordered for 11am today  - Obtain Abdominal U/S to check CBD  - Exam reassuring and BP better controlled  - Check ANCA (Rule out PSC)  - CLD work up  - Will follow  - Encouraged follow up with Dr. Mendoza upon discharge

## 2022-11-15 NOTE — PROGRESS NOTE ADULT - ASSESSMENT
73 y/o M with HTN, ulcerative colitis, cholecystectomy, presents with elevated blood pressure. Found to have transaminitis    #Transaminitis - Cholestatic  # suspect retained stone   -, , T bili 2, D bili .4, alk phos 457  -did have n/v yesterday which resolved, never had abdominal pain, currently asymptomatic  -Pt had cholcystectomy many years ago, notes that he recently had outpatient US which showed gallstones  -CT abd no acute pathology  -US abdomen to assess for CBD dilation (pending)  - GI recs appreciated   > Acute hepatitis panel   > CLD work up (ordered)   >ANCA r/o PSC   > US abdomen to assess CBD size     #HTN crisis - better  -c/w lisinopril  -c/w metoprolol    #Ulcerative colitis  #Hx of colonic resection  -Pt does not remember meds, verify with wife in am     DVT ppx: lovenox  GI ppx: ppi  Diet: dash  Dispo: acute

## 2022-11-15 NOTE — H&P ADULT - ASSESSMENT
73 y/o M with HTN, ulcerative colitis, cholecystectomy, presents with elevated blood pressure. Found to have transaminitis    #Transaminitis, suspect retained stone   -, , T bili 2, D bili .4, alk phos 457  -did have n/v yesterday which resolved, never had abdominal pain, currently asymptomatic  -Pt had cholcystectomy many years ago, notes that he recently had outpatient US which showed gallstones  -CT abd negative  -RUQ sono  -Acute hepatitis panel  -Advanced GI eval     #HtN  -c/w lisinopril  -c/w metoprolol    #Ulcerative colitis  #Hx of colonic resection  -Pt does not remember meds, verify with wife in am     MED REC: pt does not remember all his meds, verify with wife in am   DVT ppx: lovenox  GI ppx: ppi  Diet: dash  Dispo: from home

## 2022-11-15 NOTE — H&P ADULT - NSHPPHYSICALEXAM_GEN_ALL_CORE
GENERAL: NAD, lying in bed comfortably  HEAD:  Atraumatic, Normocephalic  EYES: conjunctiva and sclera clear  ENT: Moist mucous membranes  NECK: Supple, No JVD  CHEST/LUNG: Clear to auscultation bilaterally; No rales, rhonchi, wheezing, or rubs. Unlabored respirations  HEART: Regular rate and rhythm; No murmurs, rubs, or gallops  ABDOMEN: Soft, nontender, nondistended  EXTREMITIES:  No clubbing, cyanosis, or edema  NERVOUS SYSTEM:  A&Ox3

## 2022-11-15 NOTE — CONSULT NOTE ADULT - SUBJECTIVE AND OBJECTIVE BOX
Patient is a 73 y/o male with PMHx of HTN, ulcerative colitis (, Prior Resection) , cholecystectomy, presents with elevated blood pressure. Patient noted started to have headaches and nausea/vomiting which he attributes to elevated blood pressure, states that highest it was at home was 160, wouldn't come down for two days, got worried and came to the ED. Patient feels better since admission. Notes no current pain and tolerating diet. Follows Dr. Mendoza and last CRC was in 2020, was on Mesalamine at the time. Due for follow up for UC. No episodes of emesis.       Parameters below as of 15 Nov 20  PAST MEDICAL & SURGICAL HISTORY:  HTN (hypertension)  GERD (gastroesophageal reflux disease)  Colitis  H/O pilonidal cyst  History of colon resection          MEDICATIONS  (STANDING):  lisinopril 20 milliGRAM(s) Oral daily  metoprolol succinate ER 25 milliGRAM(s) Oral daily  pantoprazole    Tablet 40 milliGRAM(s) Oral before breakfast    MEDICATIONS  (PRN):      Allergies  No Known Allergies        Review of Systems  General:  Denies Fatigue, Denies Fever, Denies Weakness ,Denies Weight Loss   HEENT: Denies Trouble Swallowing ,Denies  Sore Throat , Denies Change in hearing/vision/speech ,Denies Dizziness    Cardio: Denies  Chest Pain , Palpitations    Respiratory: Denies worsening of SOB, Denies Cough  Abdomen: See detailed HPI  Neuro: Denies Headache Denies Dizziness, Denies Paresthesias  MSK: Denies pain in Bones/Joints/Muscles   Psych: Patient denies depression, denies suicidal or homicidal ideations  Integ: Patient Denies rash, or new skin lesions       Vital Signs Last 24 Hrs  T(C): 36.4 (15 Nov 2022 02:28), Max: 36.9 (15 Nov 2022 00:38)  T(F): 97.5 (15 Nov 2022 02:28), Max: 98.5 (15 Nov 2022 00:38)  HR: 74 (15 Nov 2022 04:47) (50 - 74)  BP: 145/83 (15 Nov 2022 04:47) (145/83 - 206/89)  BP(mean): --  RR: 18 (15 Nov 2022 02:28) (18 - 20)  SpO2: 98% (15 Nov 2022 02:28) (98% - 99%)    Parameters below as of 15 Nov 2022 02:28  Patient On (Oxygen Delivery Method): room air    Physical Exam  Gen: NAD  HEENT: NC/AT, Mucosal Membranes  Cardio: S1/S2 No S3/S4, Regular  Resp: CTA B/L  Abdomen: Soft, ND/NT  Neuro: AAOx3, Cranial Nerve II-XII intact   Extremities: FROM x 4  Skin: No jaundice       Labs:                          16.6   8.12  )-----------( 258      ( 14 Nov 2022 20:17 )             47.1       Auto Neutrophil %: 69.9 % (11-14-22 @ 20:17)  Auto Immature Granulocyte %: 0.5 % (11-14-22 @ 20:17)    11-14    132<L>  |  99  |  10  ----------------------------<  98  tnp   |  23  |  1.0      Calcium, Total Serum: 9.3 mg/dL (11-14-22 @ 20:17)      LFTs:             6.9  | 2.0  | 560      ------------------[457     ( 14 Nov 2022 21:39 )  4.0  | 0.4  | 512         Lipase:57       Lactate, Blood: 1.1 mmol/L (11-14-22 @ 21:39)      CARDIAC MARKERS ( 14 Nov 2022 20:17 )  x     / <0.01 ng/mL / x     / x     / x          Serum Pro-Brain Natriuretic Peptide: 248 pg/mL (11-14-22 @ 20:17)    RADIOLOGY & ADDITIONAL STUDIES:  CT Abdomen and Pelvis w/ IV Cont 11.14.22   IMPRESSION:  1.  No evidence of acute abdominopelvic pathology.

## 2022-11-16 LAB
ALBUMIN SERPL ELPH-MCNC: 3.7 G/DL — SIGNIFICANT CHANGE UP (ref 3.5–5.2)
ALP SERPL-CCNC: 525 U/L — HIGH (ref 30–115)
ALT FLD-CCNC: 350 U/L — HIGH (ref 0–41)
ANION GAP SERPL CALC-SCNC: 11 MMOL/L — SIGNIFICANT CHANGE UP (ref 7–14)
AST SERPL-CCNC: 164 U/L — HIGH (ref 0–41)
BASOPHILS # BLD AUTO: 0.04 K/UL — SIGNIFICANT CHANGE UP (ref 0–0.2)
BASOPHILS NFR BLD AUTO: 0.5 % — SIGNIFICANT CHANGE UP (ref 0–1)
BILIRUB DIRECT SERPL-MCNC: 0.4 MG/DL — HIGH (ref 0–0.3)
BILIRUB SERPL-MCNC: 1.2 MG/DL — SIGNIFICANT CHANGE UP (ref 0.2–1.2)
BUN SERPL-MCNC: 11 MG/DL — SIGNIFICANT CHANGE UP (ref 10–20)
CALCIUM SERPL-MCNC: 9.2 MG/DL — SIGNIFICANT CHANGE UP (ref 8.4–10.5)
CHLORIDE SERPL-SCNC: 104 MMOL/L — SIGNIFICANT CHANGE UP (ref 98–110)
CO2 SERPL-SCNC: 27 MMOL/L — SIGNIFICANT CHANGE UP (ref 17–32)
CREAT SERPL-MCNC: 1 MG/DL — SIGNIFICANT CHANGE UP (ref 0.7–1.5)
EGFR: 80 ML/MIN/1.73M2 — SIGNIFICANT CHANGE UP
EOSINOPHIL # BLD AUTO: 0.55 K/UL — SIGNIFICANT CHANGE UP (ref 0–0.7)
EOSINOPHIL NFR BLD AUTO: 7 % — SIGNIFICANT CHANGE UP (ref 0–8)
FERRITIN SERPL-MCNC: 321 NG/ML — SIGNIFICANT CHANGE UP (ref 30–400)
GLUCOSE SERPL-MCNC: 112 MG/DL — HIGH (ref 70–99)
HAV IGM SER-ACNC: SIGNIFICANT CHANGE UP
HBV CORE IGM SER-ACNC: SIGNIFICANT CHANGE UP
HBV SURFACE AG SER-ACNC: SIGNIFICANT CHANGE UP
HCT VFR BLD CALC: 45.5 % — SIGNIFICANT CHANGE UP (ref 42–52)
HCV AB S/CO SERPL IA: 0.04 COI — SIGNIFICANT CHANGE UP
HCV AB S/CO SERPL IA: 0.29 S/CO — SIGNIFICANT CHANGE UP (ref 0–0.99)
HCV AB SERPL-IMP: SIGNIFICANT CHANGE UP
HCV AB SERPL-IMP: SIGNIFICANT CHANGE UP
HGB BLD-MCNC: 15.6 G/DL — SIGNIFICANT CHANGE UP (ref 14–18)
IMM GRANULOCYTES NFR BLD AUTO: 0.5 % — HIGH (ref 0.1–0.3)
IRON SATN MFR SERPL: 33 % — SIGNIFICANT CHANGE UP (ref 15–50)
IRON SATN MFR SERPL: 99 UG/DL — SIGNIFICANT CHANGE UP (ref 35–150)
LYMPHOCYTES # BLD AUTO: 1.73 K/UL — SIGNIFICANT CHANGE UP (ref 1.2–3.4)
LYMPHOCYTES # BLD AUTO: 22 % — SIGNIFICANT CHANGE UP (ref 20.5–51.1)
MAGNESIUM SERPL-MCNC: 1.8 MG/DL — SIGNIFICANT CHANGE UP (ref 1.8–2.4)
MCHC RBC-ENTMCNC: 30.5 PG — SIGNIFICANT CHANGE UP (ref 27–31)
MCHC RBC-ENTMCNC: 34.3 G/DL — SIGNIFICANT CHANGE UP (ref 32–37)
MCV RBC AUTO: 89 FL — SIGNIFICANT CHANGE UP (ref 80–94)
MONOCYTES # BLD AUTO: 0.74 K/UL — HIGH (ref 0.1–0.6)
MONOCYTES NFR BLD AUTO: 9.4 % — HIGH (ref 1.7–9.3)
NEUTROPHILS # BLD AUTO: 4.77 K/UL — SIGNIFICANT CHANGE UP (ref 1.4–6.5)
NEUTROPHILS NFR BLD AUTO: 60.6 % — SIGNIFICANT CHANGE UP (ref 42.2–75.2)
NRBC # BLD: 0 /100 WBCS — SIGNIFICANT CHANGE UP (ref 0–0)
PLATELET # BLD AUTO: 276 K/UL — SIGNIFICANT CHANGE UP (ref 130–400)
POTASSIUM SERPL-MCNC: 3.8 MMOL/L — SIGNIFICANT CHANGE UP (ref 3.5–5)
POTASSIUM SERPL-SCNC: 3.8 MMOL/L — SIGNIFICANT CHANGE UP (ref 3.5–5)
PROT SERPL-MCNC: 6.6 G/DL — SIGNIFICANT CHANGE UP (ref 6–8)
RBC # BLD: 5.11 M/UL — SIGNIFICANT CHANGE UP (ref 4.7–6.1)
RBC # FLD: 13.2 % — SIGNIFICANT CHANGE UP (ref 11.5–14.5)
SODIUM SERPL-SCNC: 142 MMOL/L — SIGNIFICANT CHANGE UP (ref 135–146)
TIBC SERPL-MCNC: 303 UG/DL — SIGNIFICANT CHANGE UP (ref 220–430)
TRANSFERRIN SERPL-MCNC: 228 MG/DL — SIGNIFICANT CHANGE UP (ref 200–360)
UIBC SERPL-MCNC: 204 UG/DL — SIGNIFICANT CHANGE UP (ref 110–370)
WBC # BLD: 7.87 K/UL — SIGNIFICANT CHANGE UP (ref 4.8–10.8)
WBC # FLD AUTO: 7.87 K/UL — SIGNIFICANT CHANGE UP (ref 4.8–10.8)

## 2022-11-16 PROCEDURE — 99233 SBSQ HOSP IP/OBS HIGH 50: CPT

## 2022-11-16 RX ORDER — ONDANSETRON 8 MG/1
4 TABLET, FILM COATED ORAL EVERY 8 HOURS
Refills: 0 | Status: DISCONTINUED | OUTPATIENT
Start: 2022-11-16 | End: 2022-11-24

## 2022-11-16 RX ORDER — MESALAMINE 400 MG
4500 TABLET, DELAYED RELEASE (ENTERIC COATED) ORAL DAILY
Refills: 0 | Status: DISCONTINUED | OUTPATIENT
Start: 2022-11-16 | End: 2022-11-16

## 2022-11-16 RX ORDER — MESALAMINE 400 MG
1000 TABLET, DELAYED RELEASE (ENTERIC COATED) ORAL
Refills: 0 | Status: DISCONTINUED | OUTPATIENT
Start: 2022-11-16 | End: 2022-11-24

## 2022-11-16 RX ORDER — HYDRALAZINE HCL 50 MG
25 TABLET ORAL EVERY 8 HOURS
Refills: 0 | Status: DISCONTINUED | OUTPATIENT
Start: 2022-11-16 | End: 2022-11-18

## 2022-11-16 RX ORDER — TRAZODONE HCL 50 MG
50 TABLET ORAL AT BEDTIME
Refills: 0 | Status: DISCONTINUED | OUTPATIENT
Start: 2022-11-16 | End: 2022-11-24

## 2022-11-16 RX ORDER — NIFEDIPINE 30 MG
30 TABLET, EXTENDED RELEASE 24 HR ORAL DAILY
Refills: 0 | Status: DISCONTINUED | OUTPATIENT
Start: 2022-11-16 | End: 2022-11-24

## 2022-11-16 RX ADMIN — AZATHIOPRINE 50 MILLIGRAM(S): 100 TABLET ORAL at 11:38

## 2022-11-16 RX ADMIN — ESCITALOPRAM OXALATE 10 MILLIGRAM(S): 10 TABLET, FILM COATED ORAL at 11:38

## 2022-11-16 RX ADMIN — Medication 25 MILLIGRAM(S): at 05:49

## 2022-11-16 RX ADMIN — PANTOPRAZOLE SODIUM 40 MILLIGRAM(S): 20 TABLET, DELAYED RELEASE ORAL at 05:49

## 2022-11-16 RX ADMIN — LISINOPRIL 20 MILLIGRAM(S): 2.5 TABLET ORAL at 05:49

## 2022-11-16 RX ADMIN — Medication 30 MILLIGRAM(S): at 11:38

## 2022-11-16 RX ADMIN — ATORVASTATIN CALCIUM 20 MILLIGRAM(S): 80 TABLET, FILM COATED ORAL at 21:52

## 2022-11-16 RX ADMIN — Medication 1000 MILLIGRAM(S): at 23:04

## 2022-11-16 NOTE — PROGRESS NOTE ADULT - SUBJECTIVE AND OBJECTIVE BOX
Gastroenterology progress note:     Patient is a 72y old  Male who presents with a chief complaint of      Admitted on: 11-15-22    We are following the patient for elevated LFT      Interval History:  Denies any abdominal pain, nausea, vomiting   never c/o abdominal pain          PAST MEDICAL & SURGICAL HISTORY:  HTN (hypertension)      GERD (gastroesophageal reflux disease)      Colitis      H/O pilonidal cyst      History of colon resection          MEDICATIONS  (STANDING):  atorvastatin 20 milliGRAM(s) Oral at bedtime  azaTHIOprine 50 milliGRAM(s) Oral daily  escitalopram 10 milliGRAM(s) Oral daily  lisinopril 20 milliGRAM(s) Oral daily  mesalamine ER Capsule 4500 milliGRAM(s) Oral daily  metoprolol succinate ER 25 milliGRAM(s) Oral daily  NIFEdipine XL 30 milliGRAM(s) Oral daily  pantoprazole    Tablet 40 milliGRAM(s) Oral before breakfast    MEDICATIONS  (PRN):  hydrALAZINE 25 milliGRAM(s) Oral every 8 hours PRN for SBP >180  ondansetron Injectable 4 milliGRAM(s) IV Push every 8 hours PRN Nausea and/or Vomiting  traZODone 50 milliGRAM(s) Oral at bedtime PRN Insomnia      Allergies  No Known Allergies      Review of Systems:   Cardiovascular:  No Chest Pain, No Palpitations  Respiratory:  No Cough, No Dyspnea  Gastrointestinal:  As described in HPI    Physical Examination:  T(C): 37.2 (11-16-22 @ 12:22), Max: 37.2 (11-16-22 @ 12:22)  HR: 84 (11-16-22 @ 12:22) (74 - 93)  BP: 158/75 (11-16-22 @ 12:22) (130/58 - 185/81)  RR: 19 (11-16-22 @ 12:22) (18 - 19)  SpO2: --      Constitutional: No acute distress.  Respiratory:  No signs of respiratory distress. Lung sounds are clear bilaterally.  Cardiovascular:  S1 S2, Regular rate and rhythm.  Abdominal: Abdomen is soft, symmetric, and non-tender without distention.      Data:                        15.6   7.87  )-----------( 276      ( 16 Nov 2022 05:36 )             45.5     Hgb trend:  15.6  11-16-22 @ 05:36  15.7  11-15-22 @ 12:14  16.6  11-14-22 @ 20:17      11-16    142  |  104  |  11  ----------------------------<  112<H>  3.8   |  27  |  1.0    Ca    9.2      16 Nov 2022 05:36  Mg     1.8     11-16    TPro  6.6  /  Alb  3.7  /  TBili  1.2  /  DBili  0.4<H>  /  AST  164<H>  /  ALT  350<H>  /  AlkPhos  525<H>  11-16    Liver panel trend:  TBili 1.2   /      /      /   AlkP 525   /   Tptn 6.6   /   Alb 3.7    /   DBili 0.4      11-16  TBili 2.2   /      /      /   AlkP 553   /   Tptn 6.9   /   Alb 3.9    /   DBili --      11-15  TBili 2.0   /      /      /   AlkP 457   /   Tptn 6.9   /   Alb 4.0    /   DBili 0.4      11-14  TBili 1.9   /      /      /   AlkP 449   /   Tptn 7.6   /   Alb 3.9    /   DBili --      11-14      PT/INR - ( 15 Nov 2022 12:14 )   PT: 11.70 sec;   INR: 1.03 ratio         PTT - ( 15 Nov 2022 12:14 )  PTT:27.6 sec       Radiology:    US Abdomen Upper Quadrant Right:   ACC: [id number] EXAM:  US ABDOMEN RT UPR QUADRANT                          PROCEDURE DATE:  11/15/2022          INTERPRETATION:  CLINICAL INFORMATION: Transaminitis    COMPARISON: CT abdomen pelvis 11/14/2022.    TECHNIQUE: Sonography of the right upper quadrant.    FINDINGS:    Liver: Within normal limits.  Bile ducts: Normal caliber. Common bile duct measures 0.4 cm.  Gallbladder: Status post cholecystectomy.  Pancreas: Poorly visualized.  Right kidney: 9.2 cm. No hydronephrosis.  Ascites: None.    IMPRESSION:  Status post cholecystectomy. Unremarkable ultrasound of the liver.        --- End of Report ---            RAFA ASHFORD MD; Attending Radiologist  This document has been electronically signed. Nov 16 2022  8:55AM (11-15-22 @ 19:52)

## 2022-11-16 NOTE — PROGRESS NOTE ADULT - ASSESSMENT
73 y/o M with HTN, ulcerative colitis, cholecystectomy, presents with elevated blood pressure. Found to have transaminitis    #Transaminitis - improving  # suspect retained stone   -, , T bili 2, D bili .4, alk phos 457  -did have n/v yesterday which resolved, never had abdominal pain, currently asymptomatic  -Pt had cholcystectomy many years ago, notes that he recently had outpatient US which showed gallstones  -CT abd no acute pathology  -US abdomen: No CBD dilation  - GI recs appreciated   > Acute hepatitis panel   > CLD work up (ordered)   > ANCA r/o PSC       #HTN crisis - better but still having few elevated readings  -c/w lisinopril  -c/w metoprolol  - Add nifedipin 30mg qd  - Hydralazine PRN if SBP>180    #Vomiting   - add zofran  - f/u with GI    #Ulcerative colitis  #Hx of colonic resection  -c/w azathioprine 50mg qd  - mesalamine ER 4.5g qd (patient's home dose is 4.8g qd. Since 1.2g pills are not available in hospital but 500mg are avalable the dose was adjusted to 4.5g qd)     DVT ppx: lovenox  GI ppx: ppi  Diet: dash  Dispo: acute

## 2022-11-16 NOTE — PROGRESS NOTE ADULT - SUBJECTIVE AND OBJECTIVE BOX
SUBJECTIVE:    Patient is a 72y old Male who presents with a chief complaint of   Currently admitted to medicine with the primary diagnosis of Transaminitis    Today is hospital day 1d. He reports no new issues or overnight events.   He vomited once today but denies any abdominal pain.      PAST MEDICAL & SURGICAL HISTORY  HTN (hypertension)    GERD (gastroesophageal reflux disease)    Colitis    H/O pilonidal cyst    History of colon resection      SOCIAL HISTORY:  Negative for smoking/alcohol/drug use.     ALLERGIES:  No Known Allergies    MEDICATIONS:  STANDING MEDICATIONS  atorvastatin 20 milliGRAM(s) Oral at bedtime  azaTHIOprine 50 milliGRAM(s) Oral daily  escitalopram 10 milliGRAM(s) Oral daily  lisinopril 20 milliGRAM(s) Oral daily  mesalamine ER Capsule 4500 milliGRAM(s) Oral daily  metoprolol succinate ER 25 milliGRAM(s) Oral daily  NIFEdipine XL 30 milliGRAM(s) Oral daily  pantoprazole    Tablet 40 milliGRAM(s) Oral before breakfast    PRN MEDICATIONS  hydrALAZINE 25 milliGRAM(s) Oral every 8 hours PRN  traZODone 50 milliGRAM(s) Oral at bedtime PRN    VITALS:   T(F): 99  HR: 84  BP: 158/75  RR: 19  SpO2: --    LABS:                        15.6   7.87  )-----------( 276      ( 16 Nov 2022 05:36 )             45.5     11-16    142  |  104  |  11  ----------------------------<  112<H>  3.8   |  27  |  1.0    Ca    9.2      16 Nov 2022 05:36  Mg     1.8     11-16    TPro  6.6  /  Alb  3.7  /  TBili  1.2  /  DBili  0.4<H>  /  AST  164<H>  /  ALT  350<H>  /  AlkPhos  525<H>  11-16    PT/INR - ( 15 Nov 2022 12:14 )   PT: 11.70 sec;   INR: 1.03 ratio         PTT - ( 15 Nov 2022 12:14 )  PTT:27.6 sec      CARDIAC MARKERS ( 14 Nov 2022 20:17 )  x     / <0.01 ng/mL / x     / x     / x          RADIOLOGY:    Sonography of the right upper quadrant.  FINDINGS:  Liver: Within normal limits.  Bile ducts: Normal caliber. Common bile duct measures 0.4 cm.  Gallbladder: Status post cholecystectomy.  Pancreas: Poorly visualized.  Right kidney: 9.2 cm. No hydronephrosis.  Ascites: None.  IMPRESSION:  Status post cholecystectomy. Unremarkable ultrasound of the liver.    PHYSICAL EXAM:  GEN: No acute distress  LUNGS: Clear to auscultation bilaterally   HEART: Regular S1S2  ABD: Soft, non-tender, non-distended.  EXT: No edema, +PP  NEURO: AAOX3    Intravenous access:   NG tube:   Morel Catheter:

## 2022-11-16 NOTE — PROGRESS NOTE ADULT - ASSESSMENT
71 y/o male with PMHx of HTN, ulcerative colitis (, Prior Resection) , cholecystectomy, presents with elevated blood pressure. Patient noted started to have headaches and nausea/vomiting which he attributes to elevated blood pressure, states that highest it was at home was 160, wouldn't come down for two days, got worried and came to the ED. Patient feels better since admission. Notes no current pain and tolerating diet. Follows Dr. Mendoza and last CRC was in 2020, and inflammation was in the left colon. Exam reassuring, CT scan without acute intraabdominal process. All of the serologies ordered for 11am today are appropriate and we agree with. Obtain Abdomina U/S to evaluate CBD.     #)Abnormal LFT/ UC Hx  -No baseline LFT to compare   -Trending down LFT   - US CBD 4mm, no stones   - Exam reassuring and BP better controlled    Recs:   - CLD work up pending   - Will follow

## 2022-11-17 LAB
ALBUMIN SERPL ELPH-MCNC: 3.8 G/DL — SIGNIFICANT CHANGE UP (ref 3.5–5.2)
ALBUMIN SERPL ELPH-MCNC: 3.9 G/DL — SIGNIFICANT CHANGE UP (ref 3.5–5.2)
ALP SERPL-CCNC: 668 U/L — HIGH (ref 30–115)
ALP SERPL-CCNC: 736 U/L — HIGH (ref 30–115)
ALT FLD-CCNC: 379 U/L — HIGH (ref 0–41)
ALT FLD-CCNC: 447 U/L — HIGH (ref 0–41)
ANION GAP SERPL CALC-SCNC: 10 MMOL/L — SIGNIFICANT CHANGE UP (ref 7–14)
ANION GAP SERPL CALC-SCNC: 10 MMOL/L — SIGNIFICANT CHANGE UP (ref 7–14)
AST SERPL-CCNC: 281 U/L — HIGH (ref 0–41)
AST SERPL-CCNC: 436 U/L — HIGH (ref 0–41)
BASOPHILS # BLD AUTO: 0.06 K/UL — SIGNIFICANT CHANGE UP (ref 0–0.2)
BASOPHILS NFR BLD AUTO: 0.7 % — SIGNIFICANT CHANGE UP (ref 0–1)
BILIRUB SERPL-MCNC: 1.4 MG/DL — HIGH (ref 0.2–1.2)
BILIRUB SERPL-MCNC: 4 MG/DL — HIGH (ref 0.2–1.2)
BUN SERPL-MCNC: 6 MG/DL — LOW (ref 10–20)
BUN SERPL-MCNC: 9 MG/DL — LOW (ref 10–20)
CALCIUM SERPL-MCNC: 8.9 MG/DL — SIGNIFICANT CHANGE UP (ref 8.4–10.5)
CALCIUM SERPL-MCNC: 9.4 MG/DL — SIGNIFICANT CHANGE UP (ref 8.4–10.5)
CERULOPLASMIN SERPL-MCNC: 30 MG/DL — SIGNIFICANT CHANGE UP (ref 15–30)
CHLORIDE SERPL-SCNC: 101 MMOL/L — SIGNIFICANT CHANGE UP (ref 98–110)
CHLORIDE SERPL-SCNC: 101 MMOL/L — SIGNIFICANT CHANGE UP (ref 98–110)
CO2 SERPL-SCNC: 25 MMOL/L — SIGNIFICANT CHANGE UP (ref 17–32)
CO2 SERPL-SCNC: 30 MMOL/L — SIGNIFICANT CHANGE UP (ref 17–32)
CREAT SERPL-MCNC: 0.9 MG/DL — SIGNIFICANT CHANGE UP (ref 0.7–1.5)
CREAT SERPL-MCNC: 0.9 MG/DL — SIGNIFICANT CHANGE UP (ref 0.7–1.5)
EGFR: 91 ML/MIN/1.73M2 — SIGNIFICANT CHANGE UP
EGFR: 91 ML/MIN/1.73M2 — SIGNIFICANT CHANGE UP
EOSINOPHIL # BLD AUTO: 0.64 K/UL — SIGNIFICANT CHANGE UP (ref 0–0.7)
EOSINOPHIL NFR BLD AUTO: 7.9 % — SIGNIFICANT CHANGE UP (ref 0–8)
GLUCOSE SERPL-MCNC: 109 MG/DL — HIGH (ref 70–99)
GLUCOSE SERPL-MCNC: 147 MG/DL — HIGH (ref 70–99)
HCT VFR BLD CALC: 41.9 % — LOW (ref 42–52)
HGB BLD-MCNC: 14.4 G/DL — SIGNIFICANT CHANGE UP (ref 14–18)
IMM GRANULOCYTES NFR BLD AUTO: 0.5 % — HIGH (ref 0.1–0.3)
LYMPHOCYTES # BLD AUTO: 1.79 K/UL — SIGNIFICANT CHANGE UP (ref 1.2–3.4)
LYMPHOCYTES # BLD AUTO: 22.1 % — SIGNIFICANT CHANGE UP (ref 20.5–51.1)
MAGNESIUM SERPL-MCNC: 1.7 MG/DL — LOW (ref 1.8–2.4)
MAGNESIUM SERPL-MCNC: 1.9 MG/DL — SIGNIFICANT CHANGE UP (ref 1.8–2.4)
MCHC RBC-ENTMCNC: 30.6 PG — SIGNIFICANT CHANGE UP (ref 27–31)
MCHC RBC-ENTMCNC: 34.4 G/DL — SIGNIFICANT CHANGE UP (ref 32–37)
MCV RBC AUTO: 89 FL — SIGNIFICANT CHANGE UP (ref 80–94)
MITOCHONDRIA AB SER-ACNC: SIGNIFICANT CHANGE UP
MONOCYTES # BLD AUTO: 0.67 K/UL — HIGH (ref 0.1–0.6)
MONOCYTES NFR BLD AUTO: 8.3 % — SIGNIFICANT CHANGE UP (ref 1.7–9.3)
NEUTROPHILS # BLD AUTO: 4.89 K/UL — SIGNIFICANT CHANGE UP (ref 1.4–6.5)
NEUTROPHILS NFR BLD AUTO: 60.5 % — SIGNIFICANT CHANGE UP (ref 42.2–75.2)
NRBC # BLD: 0 /100 WBCS — SIGNIFICANT CHANGE UP (ref 0–0)
PLATELET # BLD AUTO: 269 K/UL — SIGNIFICANT CHANGE UP (ref 130–400)
POTASSIUM SERPL-MCNC: 3.3 MMOL/L — LOW (ref 3.5–5)
POTASSIUM SERPL-MCNC: 3.8 MMOL/L — SIGNIFICANT CHANGE UP (ref 3.5–5)
POTASSIUM SERPL-SCNC: 3.3 MMOL/L — LOW (ref 3.5–5)
POTASSIUM SERPL-SCNC: 3.8 MMOL/L — SIGNIFICANT CHANGE UP (ref 3.5–5)
PROT SERPL-MCNC: 6.3 G/DL — SIGNIFICANT CHANGE UP (ref 6–8)
PROT SERPL-MCNC: 6.8 G/DL — SIGNIFICANT CHANGE UP (ref 6–8)
RBC # BLD: 4.71 M/UL — SIGNIFICANT CHANGE UP (ref 4.7–6.1)
RBC # FLD: 13.5 % — SIGNIFICANT CHANGE UP (ref 11.5–14.5)
SARS-COV-2 RNA SPEC QL NAA+PROBE: SIGNIFICANT CHANGE UP
SMOOTH MUSCLE AB SER-ACNC: SIGNIFICANT CHANGE UP
SODIUM SERPL-SCNC: 136 MMOL/L — SIGNIFICANT CHANGE UP (ref 135–146)
SODIUM SERPL-SCNC: 141 MMOL/L — SIGNIFICANT CHANGE UP (ref 135–146)
WBC # BLD: 8.09 K/UL — SIGNIFICANT CHANGE UP (ref 4.8–10.8)
WBC # FLD AUTO: 8.09 K/UL — SIGNIFICANT CHANGE UP (ref 4.8–10.8)

## 2022-11-17 PROCEDURE — 43264 ERCP REMOVE DUCT CALCULI: CPT

## 2022-11-17 PROCEDURE — 43261 ENDO CHOLANGIOPANCREATOGRAPH: CPT | Mod: XU

## 2022-11-17 PROCEDURE — 43262 ENDO CHOLANGIOPANCREATOGRAPH: CPT | Mod: XU

## 2022-11-17 PROCEDURE — 99233 SBSQ HOSP IP/OBS HIGH 50: CPT

## 2022-11-17 RX ORDER — TRAMADOL HYDROCHLORIDE 50 MG/1
25 TABLET ORAL ONCE
Refills: 0 | Status: DISCONTINUED | OUTPATIENT
Start: 2022-11-17 | End: 2022-11-24

## 2022-11-17 RX ORDER — MAGNESIUM SULFATE 500 MG/ML
2 VIAL (ML) INJECTION ONCE
Refills: 0 | Status: DISCONTINUED | OUTPATIENT
Start: 2022-11-17 | End: 2022-11-17

## 2022-11-17 RX ORDER — ACETAMINOPHEN 500 MG
1000 TABLET ORAL EVERY 6 HOURS
Refills: 0 | Status: DISCONTINUED | OUTPATIENT
Start: 2022-11-17 | End: 2022-11-24

## 2022-11-17 RX ORDER — MAGNESIUM SULFATE 500 MG/ML
2 VIAL (ML) INJECTION ONCE
Refills: 0 | Status: COMPLETED | OUTPATIENT
Start: 2022-11-17 | End: 2022-11-17

## 2022-11-17 RX ADMIN — Medication 50 MILLIGRAM(S): at 18:22

## 2022-11-17 RX ADMIN — Medication 1000 MILLIGRAM(S): at 11:20

## 2022-11-17 RX ADMIN — AZATHIOPRINE 50 MILLIGRAM(S): 100 TABLET ORAL at 12:37

## 2022-11-17 RX ADMIN — Medication 1000 MILLIGRAM(S): at 12:39

## 2022-11-17 RX ADMIN — Medication 30 MILLIGRAM(S): at 05:38

## 2022-11-17 RX ADMIN — ESCITALOPRAM OXALATE 10 MILLIGRAM(S): 10 TABLET, FILM COATED ORAL at 12:37

## 2022-11-17 RX ADMIN — Medication 25 MILLIGRAM(S): at 05:37

## 2022-11-17 RX ADMIN — ATORVASTATIN CALCIUM 20 MILLIGRAM(S): 80 TABLET, FILM COATED ORAL at 22:20

## 2022-11-17 RX ADMIN — PANTOPRAZOLE SODIUM 40 MILLIGRAM(S): 20 TABLET, DELAYED RELEASE ORAL at 05:38

## 2022-11-17 RX ADMIN — Medication 25 GRAM(S): at 10:40

## 2022-11-17 RX ADMIN — Medication 1000 MILLIGRAM(S): at 05:39

## 2022-11-17 RX ADMIN — Medication 1000 MILLIGRAM(S): at 18:28

## 2022-11-17 RX ADMIN — Medication 1000 MILLIGRAM(S): at 12:12

## 2022-11-17 RX ADMIN — LISINOPRIL 20 MILLIGRAM(S): 2.5 TABLET ORAL at 05:38

## 2022-11-17 NOTE — PROGRESS NOTE ADULT - ASSESSMENT
73 y/o M with HTN, ulcerative colitis, cholecystectomy, presents with elevated blood pressure. Found to have transaminitis    #Transaminitis ]- cholestatic  # suspect retained stone   -Pt had cholcystectomy many years ago, notes that he recently had outpatient US which showed gallstones?  -CT abd no acute pathology  -US abdomen: No CBD dilation  - GI recs appreciated   > Acute hepatitis panel   > CLD work up (ordered)   > ANCA r/o PSC   > MRCP to r/o PSC vs stones (ordered)  - GI f/u pending: (Might consider ERCP/EUS in  setting worseing LFTs)    #HTN crisis - better   -c/w lisinopril  -c/w metoprolol  - Add nifedipine 30mg qd  - Hydralazine PRN if SBP>180    #Vomiting   - add zofran  - f/u with GI    #Ulcerative colitis  #Hx of colonic resection  -c/w azathioprine 50mg qd  - c/w [id number] mg qid     DVT ppx: lovenox  GI ppx: ppi  Diet: dash  Dispo: acute

## 2022-11-17 NOTE — PROGRESS NOTE ADULT - SUBJECTIVE AND OBJECTIVE BOX
SUBJECTIVE:    Patient is a 72y old Male who presents with a chief complaint of Elevated LFT (16 Nov 2022 17:23)    Currently admitted to medicine with the primary diagnosis of Transaminitis     Today is hospital day 2d. This morning he is resting comfortably in bed and reports no new issues or overnight events.     PAST MEDICAL & SURGICAL HISTORY  HTN (hypertension)    GERD (gastroesophageal reflux disease)    Colitis    H/O pilonidal cyst    History of colon resection      SOCIAL HISTORY:  Negative for smoking/alcohol/drug use.     ALLERGIES:  No Known Allergies    MEDICATIONS:  STANDING MEDICATIONS  atorvastatin 20 milliGRAM(s) Oral at bedtime  azaTHIOprine 50 milliGRAM(s) Oral daily  escitalopram 10 milliGRAM(s) Oral daily  lisinopril 20 milliGRAM(s) Oral daily  magnesium sulfate  IVPB 2 Gram(s) IV Intermittent once  mesalamine ER Capsule 1000 milliGRAM(s) Oral four times a day  metoprolol succinate ER 25 milliGRAM(s) Oral daily  NIFEdipine XL 30 milliGRAM(s) Oral daily  pantoprazole    Tablet 40 milliGRAM(s) Oral before breakfast    PRN MEDICATIONS  acetaminophen     Tablet .. 1000 milliGRAM(s) Oral every 6 hours PRN  hydrALAZINE 25 milliGRAM(s) Oral every 8 hours PRN  ondansetron Injectable 4 milliGRAM(s) IV Push every 8 hours PRN  traZODone 50 milliGRAM(s) Oral at bedtime PRN    VITALS:   T(F): 97.5  HR: 99  BP: 133/61  RR: 20  SpO2: 99%    LABS:                        15.6   7.87  )-----------( 276      ( 16 Nov 2022 05:36 )             45.5     11-17    141  |  101  |  6<L>  ----------------------------<  109<H>  3.8   |  30  |  0.9    Ca    9.4      17 Nov 2022 07:01  Mg     1.7     11-17    TPro  6.8  /  Alb  3.9  /  TBili  4.0<H>  /  DBili  x   /  AST  436<H>  /  ALT  447<H>  /  AlkPhos  736<H>  11-17    RADIOLOGY:    PHYSICAL EXAM:  GEN: No acute distress  LUNGS: Clear to auscultation bilaterally   HEART: Regular  ABD: Soft, non-tender, non-distended.  EXT: No edema,  +PP  NEURO: AAOX3    Intravenous access:   NG tube:   Morel Catheter:        SUBJECTIVE:    Patient is a 72y old Male who presents with a chief complaint of Elevated LFT (16 Nov 2022 17:23)    Currently admitted to medicine with the primary diagnosis of Transaminitis     Today is hospital day 2d. This morning he is resting comfortably in bed and reports no new issues or overnight events.     PAST MEDICAL & SURGICAL HISTORY  HTN (hypertension)    GERD (gastroesophageal reflux disease)    Colitis    H/O pilonidal cyst    History of colon resection      SOCIAL HISTORY:  Negative for smoking/alcohol/drug use.     ALLERGIES:  No Known Allergies    MEDICATIONS:  STANDING MEDICATIONS  atorvastatin 20 milliGRAM(s) Oral at bedtime  azaTHIOprine 50 milliGRAM(s) Oral daily  escitalopram 10 milliGRAM(s) Oral daily  lisinopril 20 milliGRAM(s) Oral daily  magnesium sulfate  IVPB 2 Gram(s) IV Intermittent once  mesalamine ER Capsule 1000 milliGRAM(s) Oral four times a day  metoprolol succinate ER 25 milliGRAM(s) Oral daily  NIFEdipine XL 30 milliGRAM(s) Oral daily  pantoprazole    Tablet 40 milliGRAM(s) Oral before breakfast    PRN MEDICATIONS  acetaminophen     Tablet .. 1000 milliGRAM(s) Oral every 6 hours PRN  hydrALAZINE 25 milliGRAM(s) Oral every 8 hours PRN  ondansetron Injectable 4 milliGRAM(s) IV Push every 8 hours PRN  traZODone 50 milliGRAM(s) Oral at bedtime PRN    VITALS:   T(F): 97.5  HR: 99  BP: 133/61  RR: 20  SpO2: 99%    LABS:                        15.6   7.87  )-----------( 276      ( 16 Nov 2022 05:36 )             45.5     11-17    141  |  101  |  6<L>  ----------------------------<  109<H>  3.8   |  30  |  0.9    Ca    9.4      17 Nov 2022 07:01  Mg     1.7     11-17    TPro  6.8  /  Alb  3.9  /  TBili  4.0<H>  /  DBili  x   /  AST  436<H>  /  ALT  447<H>  /  AlkPhos  736<H>  11-17    RADIOLOGY:    PHYSICAL EXAM:  GEN: No acute distress pleasant   LUNGS: Clear to auscultation bilaterally no wheezes  HEART: RRR no MRG   ABD: Soft, non-tender, non-distended.  EXT: No edema,  +PP  NEURO: AAOX3    Intravenous access:   NG tube:   Morel Catheter:

## 2022-11-18 ENCOUNTER — TRANSCRIPTION ENCOUNTER (OUTPATIENT)
Age: 72
End: 2022-11-18

## 2022-11-18 LAB — LKM AB SER-ACNC: <20.1 UNITS — SIGNIFICANT CHANGE UP (ref 0–20)

## 2022-11-18 PROCEDURE — 99233 SBSQ HOSP IP/OBS HIGH 50: CPT

## 2022-11-18 RX ORDER — POTASSIUM CHLORIDE 20 MEQ
40 PACKET (EA) ORAL EVERY 4 HOURS
Refills: 0 | Status: COMPLETED | OUTPATIENT
Start: 2022-11-18 | End: 2022-11-19

## 2022-11-18 RX ORDER — INDOMETHACIN 50 MG
100 CAPSULE ORAL ONCE
Refills: 0 | Status: COMPLETED | OUTPATIENT
Start: 2022-11-18 | End: 2022-11-18

## 2022-11-18 RX ADMIN — ESCITALOPRAM OXALATE 10 MILLIGRAM(S): 10 TABLET, FILM COATED ORAL at 19:03

## 2022-11-18 RX ADMIN — Medication 40 MILLIEQUIVALENT(S): at 19:03

## 2022-11-18 RX ADMIN — Medication 1000 MILLIGRAM(S): at 19:02

## 2022-11-18 RX ADMIN — Medication 30 MILLIGRAM(S): at 05:50

## 2022-11-18 RX ADMIN — PANTOPRAZOLE SODIUM 40 MILLIGRAM(S): 20 TABLET, DELAYED RELEASE ORAL at 05:51

## 2022-11-18 RX ADMIN — Medication 100 MILLIGRAM(S): at 15:12

## 2022-11-18 RX ADMIN — Medication 25 MILLIGRAM(S): at 05:50

## 2022-11-18 RX ADMIN — AZATHIOPRINE 50 MILLIGRAM(S): 100 TABLET ORAL at 19:03

## 2022-11-18 RX ADMIN — Medication 1000 MILLIGRAM(S): at 05:49

## 2022-11-18 RX ADMIN — Medication 1000 MILLIGRAM(S): at 00:36

## 2022-11-18 RX ADMIN — LISINOPRIL 20 MILLIGRAM(S): 2.5 TABLET ORAL at 05:50

## 2022-11-18 NOTE — PROGRESS NOTE ADULT - SUBJECTIVE AND OBJECTIVE BOX
SHARONDATAVO  72y, Male  Allergy: No Known Allergies    Hospital Day: 3d    Patient seen and examined earlier today. Feeling well, plan for ERCP today.     PMH/PSH:  PAST MEDICAL & SURGICAL HISTORY:  HTN (hypertension)      GERD (gastroesophageal reflux disease)      Colitis      H/O pilonidal cyst      History of colon resection          LAST 24-Hr EVENTS:    VITALS:  T(F): 98.2 (11-18-22 @ 13:45), Max: 98.9 (11-18-22 @ 13:15)  HR: 98 (11-18-22 @ 13:52)  BP: 136/90 (11-18-22 @ 13:52) (130/67 - 160/76)  RR: 18 (11-18-22 @ 13:52)  SpO2: 99% (11-18-22 @ 13:52)        TESTS & MEASUREMENTS:  Weight (Kg): 64 (11-18-22 @ 13:52)  BMI (kg/m2): 24.2 (11-18)    11-17-22 @ 07:01  -  11-18-22 @ 07:00  --------------------------------------------------------  IN: 849 mL / OUT: 0 mL / NET: 849 mL                            14.4   8.09  )-----------( 269      ( 17 Nov 2022 22:17 )             41.9       11-17    136  |  101  |  9<L>  ----------------------------<  147<H>  3.3<L>   |  25  |  0.9    Ca    8.9      17 Nov 2022 22:17  Mg     1.9     11-17    TPro  6.3  /  Alb  3.8  /  TBili  1.4<H>  /  DBili  x   /  AST  281<H>  /  ALT  379<H>  /  AlkPhos  668<H>  11-17    LIVER FUNCTIONS - ( 17 Nov 2022 22:17 )  Alb: 3.8 g/dL / Pro: 6.3 g/dL / ALK PHOS: 668 U/L / ALT: 379 U/L / AST: 281 U/L / GGT: x                       Ferritin, Serum: 321 ng/mL (11-16-22 @ 05:36)    Serum Pro-Brain Natriuretic Peptide: 248 pg/mL (11-14-22 @ 20:17)    COVID-19 PCR: NotDetec (11-17-22 @ 17:13)  COVID-19 PCR: NotDetec (11-14-22 @ 22:27)      RADIOLOGY, ECG, & ADDITIONAL TESTS:      RECENT DIAGNOSTIC ORDERS:  Complete Blood Count + Automated Diff: AM Sched. Collection: 19-Nov-2022 04:30 (11-18-22 @ 14:48)  Magnesium, Serum: AM Sched. Collection: 19-Nov-2022 04:30 (11-18-22 @ 14:48)  Comprehensive Metabolic Panel: AM Sched. Collection: 19-Nov-2022 04:30 (11-18-22 @ 14:48)  Xray Kidney Ureter Bladder: 13:56  Exam Completed (11-18-22 @ 13:56)  Diet, NPO after Midnight:      NPO Start Date: 17-Nov-2022,   NPO Start Time: 23:59 (11-17-22 @ 16:46)  Diet, NPO after Midnight:      NPO Start Date: 17-Nov-2022,   NPO Start Time: 23:59 (11-17-22 @ 16:44)      MEDICATIONS:  MEDICATIONS  (STANDING):  atorvastatin 20 milliGRAM(s) Oral at bedtime  azaTHIOprine 50 milliGRAM(s) Oral daily  escitalopram 10 milliGRAM(s) Oral daily  lisinopril 20 milliGRAM(s) Oral daily  mesalamine ER Capsule 1000 milliGRAM(s) Oral four times a day  metoprolol succinate ER 25 milliGRAM(s) Oral daily  NIFEdipine XL 30 milliGRAM(s) Oral daily  pantoprazole    Tablet 40 milliGRAM(s) Oral before breakfast  potassium chloride    Tablet ER 40 milliEquivalent(s) Oral every 4 hours  traMADol 25 milliGRAM(s) Oral once    MEDICATIONS  (PRN):  acetaminophen     Tablet .. 1000 milliGRAM(s) Oral every 6 hours PRN Mild Pain (1 - 3)  ondansetron Injectable 4 milliGRAM(s) IV Push every 8 hours PRN Nausea and/or Vomiting  traZODone 50 milliGRAM(s) Oral at bedtime PRN Insomnia      HOME MEDICATIONS:  azaTHIOprine 50 mg oral tablet (11-15)  escitalopram 10 mg oral tablet (11-15)  mesalamine 1.2 g oral delayed release tablet (11-15)  metoprolol succinate 25 mg oral tablet, extended release (11-15)  omeprazole 40 mg oral delayed release capsule (01-15)  quinapril 20 mg oral tablet (01-15)  rosuvastatin 5 mg oral tablet (11-15)      PHYSICAL EXAM:  GEN: No acute distress pleasant   LUNGS: Clear to auscultation bilaterally no wheezes  HEART: RRR no MRG   ABD: Soft, non-tender, non-distended.  EXT: No edema,  +PP  NEURO: AAOX3, no focal sensory or motor defecits

## 2022-11-18 NOTE — PROGRESS NOTE ADULT - ASSESSMENT
71 y/o M with HTN, ulcerative colitis, cholecystectomy, presents with elevated blood pressure. Found to have transaminitis    #Transaminitis, Obstructive Pattern 2/2 CBD Stricture   -Pt had cholecystectomy many years ago, notes that he recently had outpatient US which showed gallstones  -CT abd no acute pathology  -US abdomen: No CBD dilation  ERCP with sphincterotomy:   Cholangiogram showed dilated CBD with smooth distal CBD stricture most likely inflammatory   Plan:   If no pain in 4hrs then liquid diet today  Advance diet as tolerated in the morning   Monitor CBC and LFTs   Monitor for Post ERCP complication including bleeding and post ERCP pancreatitis.      # HTN emergency- improved   - severe headache with onset of HTN  - blood pressure resolved after receiving home meds  - unsure why BP accelerated as patient states he was compliant with his home medications, did not miss dose; no identifying aggrevating factors ie stress, uncontrolled pain, etc  - c/w lisinopril, metoprolol   - add nifedipine , BP better controlled today     #Ulcerative colitis  #Hx of colonic resection  -c/w azathioprine 50mg qd  -  home dose is Lialda 4.8g qd, appropriate conversion to Pent[employer] ( formulary here ) is 1g qid     Dispo: ant dc tomorrow     Total time spent to complete patient's bedside assessment, review medical chart, discuss medical plan of care with covering medical team was more than 35 minutes  with >50% of time spent face to face with patient, discussion with patient/family and/or coordination of care      Nohemi Pedro DO .

## 2022-11-18 NOTE — PROGRESS NOTE ADULT - SUBJECTIVE AND OBJECTIVE BOX
SUBJECTIVE:    Patient is a 72y old Male who presents with a chief complaint of Elevated LFT (16 Nov 2022 17:23)    Currently admitted to medicine with the primary diagnosis of Transaminitis       Today is hospital day 3d. This morning he is resting comfortably in bed and reports no new issues or overnight events.     PAST MEDICAL & SURGICAL HISTORY  HTN (hypertension)    GERD (gastroesophageal reflux disease)    Colitis    H/O pilonidal cyst    History of colon resection      SOCIAL HISTORY:  Negative for smoking/alcohol/drug use.     ALLERGIES:  No Known Allergies    MEDICATIONS:  STANDING MEDICATIONS  atorvastatin 20 milliGRAM(s) Oral at bedtime  azaTHIOprine 50 milliGRAM(s) Oral daily  escitalopram 10 milliGRAM(s) Oral daily  lisinopril 20 milliGRAM(s) Oral daily  mesalamine ER Capsule 1000 milliGRAM(s) Oral four times a day  metoprolol succinate ER 25 milliGRAM(s) Oral daily  NIFEdipine XL 30 milliGRAM(s) Oral daily  pantoprazole    Tablet 40 milliGRAM(s) Oral before breakfast  potassium chloride    Tablet ER 40 milliEquivalent(s) Oral every 4 hours  traMADol 25 milliGRAM(s) Oral once    PRN MEDICATIONS  acetaminophen     Tablet .. 1000 milliGRAM(s) Oral every 6 hours PRN  ondansetron Injectable 4 milliGRAM(s) IV Push every 8 hours PRN  traZODone 50 milliGRAM(s) Oral at bedtime PRN    VITALS:   T(F): 98.2  HR: 98  BP: 136/90  RR: 18  SpO2: 99%    LABS:                        14.4   8.09  )-----------( 269      ( 17 Nov 2022 22:17 )             41.9     11-17    136  |  101  |  9<L>  ----------------------------<  147<H>  3.3<L>   |  25  |  0.9    Ca    8.9      17 Nov 2022 22:17  Mg     1.9     11-17    TPro  6.3  /  Alb  3.8  /  TBili  1.4<H>  /  DBili  x   /  AST  281<H>  /  ALT  379<H>  /  AlkPhos  668<H>  11-17        RADIOLOGY:    PHYSICAL EXAM:  GEN: No acute distress  LUNGS: Clear to auscultation bilaterally   HEART: Regular  ABD: Soft, non-tender, non-distended.  EXT: NC/NC/NE/2+PP/PRICE/Skin Intact.   NEURO: AAOX3    Intravenous access:   NG tube:   Morel Catheter:

## 2022-11-18 NOTE — PROGRESS NOTE ADULT - ASSESSMENT
73 y/o M with HTN, ulcerative colitis, cholecystectomy, presents with elevated blood pressure. Found to have transaminitis    #Transaminitis ]- cholestatic  # suspect retained stone   -Pt had cholcystectomy many years ago, notes that he recently had outpatient US which showed gallstones?  -CT abd no acute pathology  -US abdomen: No CBD dilation  - GI recs appreciated   > Acute hepatitis panel   > CLD work up (ordered)   > ANCA r/o PSC  - GI ERCP: Cholangiogram showed dilated CBD with smooth distal CBD stricture most likely inflammatory. Multiple sweeps were made with removal of sludge     #HTN crisis - better   -c/w lisinopril  -c/w metoprolol  - Add nifedipine 30mg qd    #Vomiting   - add zofran  - f/u with GI    #Ulcerative colitis  #Hx of colonic resection  -c/w azathioprine 50mg qd  - c/w [id number] mg qid     DVT ppx: lovenox  GI ppx: ppi  Diet: dash  Dispo: Prep for discharge

## 2022-11-19 ENCOUNTER — TRANSCRIPTION ENCOUNTER (OUTPATIENT)
Age: 72
End: 2022-11-19

## 2022-11-19 LAB
ALBUMIN SERPL ELPH-MCNC: 4.2 G/DL — SIGNIFICANT CHANGE UP (ref 3.5–5.2)
ALP SERPL-CCNC: 871 U/L — HIGH (ref 30–115)
ALT FLD-CCNC: 511 U/L — HIGH (ref 0–41)
ANION GAP SERPL CALC-SCNC: 11 MMOL/L — SIGNIFICANT CHANGE UP (ref 7–14)
AST SERPL-CCNC: 449 U/L — HIGH (ref 0–41)
BASOPHILS # BLD AUTO: 0.06 K/UL — SIGNIFICANT CHANGE UP (ref 0–0.2)
BASOPHILS NFR BLD AUTO: 0.4 % — SIGNIFICANT CHANGE UP (ref 0–1)
BILIRUB SERPL-MCNC: 2.8 MG/DL — HIGH (ref 0.2–1.2)
BUN SERPL-MCNC: 14 MG/DL — SIGNIFICANT CHANGE UP (ref 10–20)
CALCIUM SERPL-MCNC: 9.8 MG/DL — SIGNIFICANT CHANGE UP (ref 8.4–10.5)
CHLORIDE SERPL-SCNC: 103 MMOL/L — SIGNIFICANT CHANGE UP (ref 98–110)
CO2 SERPL-SCNC: 27 MMOL/L — SIGNIFICANT CHANGE UP (ref 17–32)
CREAT SERPL-MCNC: 0.8 MG/DL — SIGNIFICANT CHANGE UP (ref 0.7–1.5)
EGFR: 94 ML/MIN/1.73M2 — SIGNIFICANT CHANGE UP
EOSINOPHIL # BLD AUTO: 0.13 K/UL — SIGNIFICANT CHANGE UP (ref 0–0.7)
EOSINOPHIL NFR BLD AUTO: 0.9 % — SIGNIFICANT CHANGE UP (ref 0–8)
GLUCOSE SERPL-MCNC: 128 MG/DL — HIGH (ref 70–99)
HCT VFR BLD CALC: 48.3 % — SIGNIFICANT CHANGE UP (ref 42–52)
HGB BLD-MCNC: 16 G/DL — SIGNIFICANT CHANGE UP (ref 14–18)
IMM GRANULOCYTES NFR BLD AUTO: 0.6 % — HIGH (ref 0.1–0.3)
LYMPHOCYTES # BLD AUTO: 1.6 K/UL — SIGNIFICANT CHANGE UP (ref 1.2–3.4)
LYMPHOCYTES # BLD AUTO: 11.3 % — LOW (ref 20.5–51.1)
MAGNESIUM SERPL-MCNC: 1.8 MG/DL — SIGNIFICANT CHANGE UP (ref 1.8–2.4)
MCHC RBC-ENTMCNC: 30.2 PG — SIGNIFICANT CHANGE UP (ref 27–31)
MCHC RBC-ENTMCNC: 33.1 G/DL — SIGNIFICANT CHANGE UP (ref 32–37)
MCV RBC AUTO: 91.3 FL — SIGNIFICANT CHANGE UP (ref 80–94)
MONOCYTES # BLD AUTO: 0.96 K/UL — HIGH (ref 0.1–0.6)
MONOCYTES NFR BLD AUTO: 6.8 % — SIGNIFICANT CHANGE UP (ref 1.7–9.3)
NEUTROPHILS # BLD AUTO: 11.35 K/UL — HIGH (ref 1.4–6.5)
NEUTROPHILS NFR BLD AUTO: 80 % — HIGH (ref 42.2–75.2)
NRBC # BLD: 0 /100 WBCS — SIGNIFICANT CHANGE UP (ref 0–0)
PLATELET # BLD AUTO: 333 K/UL — SIGNIFICANT CHANGE UP (ref 130–400)
POTASSIUM SERPL-MCNC: 4.8 MMOL/L — SIGNIFICANT CHANGE UP (ref 3.5–5)
POTASSIUM SERPL-SCNC: 4.8 MMOL/L — SIGNIFICANT CHANGE UP (ref 3.5–5)
PROT SERPL-MCNC: 6.7 G/DL — SIGNIFICANT CHANGE UP (ref 6–8)
RBC # BLD: 5.29 M/UL — SIGNIFICANT CHANGE UP (ref 4.7–6.1)
RBC # FLD: 13.7 % — SIGNIFICANT CHANGE UP (ref 11.5–14.5)
SODIUM SERPL-SCNC: 141 MMOL/L — SIGNIFICANT CHANGE UP (ref 135–146)
WBC # BLD: 14.19 K/UL — HIGH (ref 4.8–10.8)
WBC # FLD AUTO: 14.19 K/UL — HIGH (ref 4.8–10.8)

## 2022-11-19 PROCEDURE — 99233 SBSQ HOSP IP/OBS HIGH 50: CPT

## 2022-11-19 RX ORDER — POTASSIUM CHLORIDE 20 MEQ
40 PACKET (EA) ORAL EVERY 4 HOURS
Refills: 0 | Status: DISCONTINUED | OUTPATIENT
Start: 2022-11-19 | End: 2022-11-19

## 2022-11-19 RX ORDER — NIFEDIPINE 30 MG
1 TABLET, EXTENDED RELEASE 24 HR ORAL
Qty: 0 | Refills: 0 | DISCHARGE
Start: 2022-11-19

## 2022-11-19 RX ORDER — NIFEDIPINE 30 MG
1 TABLET, EXTENDED RELEASE 24 HR ORAL
Qty: 30 | Refills: 0
Start: 2022-11-19 | End: 2022-12-18

## 2022-11-19 RX ORDER — ENOXAPARIN SODIUM 100 MG/ML
40 INJECTION SUBCUTANEOUS EVERY 24 HOURS
Refills: 0 | Status: DISCONTINUED | OUTPATIENT
Start: 2022-11-19 | End: 2022-11-21

## 2022-11-19 RX ADMIN — Medication 25 MILLIGRAM(S): at 06:38

## 2022-11-19 RX ADMIN — ESCITALOPRAM OXALATE 10 MILLIGRAM(S): 10 TABLET, FILM COATED ORAL at 11:16

## 2022-11-19 RX ADMIN — Medication 1000 MILLIGRAM(S): at 01:26

## 2022-11-19 RX ADMIN — Medication 30 MILLIGRAM(S): at 06:37

## 2022-11-19 RX ADMIN — PANTOPRAZOLE SODIUM 40 MILLIGRAM(S): 20 TABLET, DELAYED RELEASE ORAL at 06:37

## 2022-11-19 RX ADMIN — Medication 1000 MILLIGRAM(S): at 07:39

## 2022-11-19 RX ADMIN — Medication 1000 MILLIGRAM(S): at 17:10

## 2022-11-19 RX ADMIN — AZATHIOPRINE 50 MILLIGRAM(S): 100 TABLET ORAL at 11:16

## 2022-11-19 RX ADMIN — LISINOPRIL 20 MILLIGRAM(S): 2.5 TABLET ORAL at 06:38

## 2022-11-19 RX ADMIN — Medication 1000 MILLIGRAM(S): at 11:19

## 2022-11-19 RX ADMIN — ATORVASTATIN CALCIUM 20 MILLIGRAM(S): 80 TABLET, FILM COATED ORAL at 01:22

## 2022-11-19 RX ADMIN — Medication 40 MILLIEQUIVALENT(S): at 07:39

## 2022-11-19 RX ADMIN — Medication 40 MILLIEQUIVALENT(S): at 01:22

## 2022-11-19 RX ADMIN — ATORVASTATIN CALCIUM 20 MILLIGRAM(S): 80 TABLET, FILM COATED ORAL at 22:07

## 2022-11-19 NOTE — DISCHARGE NOTE PROVIDER - PROVIDER TOKENS
PROVIDER:[TOKEN:[[id number]]],PROVIDER:[TOKEN:[[id number]]] PROVIDER:[TOKEN:[[id number]]],PROVIDER:[TOKEN:[[id number]],SCHEDULEDAPPT:[12/08/2022]]

## 2022-11-19 NOTE — PROGRESS NOTE ADULT - SUBJECTIVE AND OBJECTIVE BOX
SUBJECTIVE:    Patient is a 72y old Male who presents with a chief complaint of hypertension (19 Nov 2022 09:57)    Currently admitted to medicine with the primary diagnosis of Transaminitis       Today is hospital day 4d. This morning he is resting comfortably in bed and reports no new issues or overnight events.     PAST MEDICAL & SURGICAL HISTORY  HTN (hypertension)    GERD (gastroesophageal reflux disease)    Colitis    H/O pilonidal cyst    History of colon resection      SOCIAL HISTORY:  Negative for smoking/alcohol/drug use.     ALLERGIES:  No Known Allergies    MEDICATIONS:  STANDING MEDICATIONS  atorvastatin 20 milliGRAM(s) Oral at bedtime  azaTHIOprine 50 milliGRAM(s) Oral daily  escitalopram 10 milliGRAM(s) Oral daily  lisinopril 20 milliGRAM(s) Oral daily  mesalamine ER Capsule 1000 milliGRAM(s) Oral four times a day  metoprolol succinate ER 25 milliGRAM(s) Oral daily  NIFEdipine XL 30 milliGRAM(s) Oral daily  pantoprazole    Tablet 40 milliGRAM(s) Oral before breakfast  potassium chloride    Tablet ER 40 milliEquivalent(s) Oral every 4 hours  traMADol 25 milliGRAM(s) Oral once    PRN MEDICATIONS  acetaminophen     Tablet .. 1000 milliGRAM(s) Oral every 6 hours PRN  ondansetron Injectable 4 milliGRAM(s) IV Push every 8 hours PRN  traZODone 50 milliGRAM(s) Oral at bedtime PRN    VITALS:   T(F): 97  HR: 97  BP: 144/71  RR: 17  SpO2: 99%    LABS:                        16.0   14.19 )-----------( 333      ( 19 Nov 2022 07:56 )             48.3     11-19    141  |  103  |  14  ----------------------------<  128<H>  4.8   |  27  |  0.8    Ca    9.8      19 Nov 2022 07:56  Mg     1.8     11-19    TPro  6.7  /  Alb  4.2  /  TBili  2.8<H>  /  DBili  x   /  AST  449<H>  /  ALT  511<H>  /  AlkPhos  871<H>  11-19      RADIOLOGY:    PHYSICAL EXAM:  GEN: No acute distress  LUNGS: Clear to auscultation bilaterally   HEART: Regular  ABD: Soft, non-tender, non-distended.  EXT: NC/NC/NE/2+PP/PRICE/Skin Intact.   NEURO: AAOX3    Intravenous access:   NG tube:   Morel Catheter:

## 2022-11-19 NOTE — DISCHARGE NOTE PROVIDER - CARE PROVIDER_API CALL
ROSANNA GRANDA  Internal Medicine  [address] [facility] [address]  [place] [address]  Phone: [phone]  Fax: [phone]  Follow Up Time:     Yared Adair)  Gastroenterology; Internal Medicine  [address] [place] [address]  Phone: [phone]  Fax: [phone]  Follow Up Time:    ROSANNA GRANDA  Internal Medicine  [address] [facility] [address]  [place] [address]  Phone: ()-  Fax: ()-  Follow Up Time:     Alex Tate)  Gastroenterology; Internal Medicine  [address] [place] [address]  Phone: [phone]  Fax: [phone]  Scheduled Appointment: 12/08/2022

## 2022-11-19 NOTE — DISCHARGE NOTE PROVIDER - CARE PROVIDERS DIRECT ADDRESSES
,DirectAddress_Unknown,DirectAddress_Unknown ,DirectAddress_Unknown,[email or web address]

## 2022-11-19 NOTE — DISCHARGE NOTE PROVIDER - NSDCFUADDINST_GEN_ALL_CORE_FT
Follow up Set Thursday December 8th with [facility].  Return to ER if he develops fever, chills, abdominal pain, nausea, or if jaundice return

## 2022-11-19 NOTE — DISCHARGE NOTE PROVIDER - ATTENDING DISCHARGE PHYSICAL EXAMINATION:
Gen- elderly M, NAD, non toxic  Eyes- anicteric sclera, EOMI  Chest- symmetrical chest rise, no accessory muscle use  Cardiac- non tachycardic  Abdomen- non distended  Ext- spontaneously moving all 4 ext against gravity  Skin- without jaundice, warm to touch

## 2022-11-19 NOTE — PROGRESS NOTE ADULT - ASSESSMENT
71 y/o M with HTN, ulcerative colitis, cholecystectomy, presents with elevated blood pressure. Found to have transaminitis    #Transaminitis ]- cholestatic  # suspect retained stone   -Pt had cholcystectomy many years ago, notes that he recently had outpatient US which showed gallstones?  -CT abd no acute pathology  -US abdomen: No CBD dilation  - GI ERCP: Cholangiogram showed dilated CBD with smooth distal CBD stricture most likely inflammatory. Multiple sweeps were made with removal of sludge   - LFTs worsen post ERCP however patient is asymptomatic    >repeat CMP for tomorrow    #HTN crisis - better   -c/w lisinopril  -c/w metoprolol  - Add nifedipine 30mg qd    #Vomiting   - add zofran  - f/u with GI    #Ulcerative colitis  #Hx of colonic resection  -c/w azathioprine 50mg qd  - c/w [id number] mg qid     DVT ppx: lovenox  GI ppx: ppi  Diet: dash  Dispo: Prep for discharge

## 2022-11-19 NOTE — DISCHARGE NOTE PROVIDER - NSDCMRMEDTOKEN_GEN_ALL_CORE_FT
azaTHIOprine 50 mg oral tablet: 1 tab(s) orally once a day  escitalopram 10 mg oral tablet: 1 tab(s) orally once a day  mesalamine 1.2 g oral delayed release tablet: 4 tab(s) orally once a day  metoprolol succinate 25 mg oral tablet, extended release: 1 tab(s) orally once a day  NIFEdipine 30 mg oral tablet, extended release: 1 tab(s) orally once a day  omeprazole 40 mg oral delayed release capsule: 1 cap(s) orally once a day  quinapril 20 mg oral tablet: 1 tab(s) orally once a day  rosuvastatin 5 mg oral tablet: 1 tab(s) orally once a day

## 2022-11-19 NOTE — DISCHARGE NOTE PROVIDER - HOSPITAL COURSE
71 y/o M with HTN, ulcerative colitis, cholecystectomy, presents with elevated blood pressure. Two days ago pt started to have headaches and nausea/vomiting which he attributes to elevated blood pressure, states that highest it was at home was 160, wouldn't come down for two days, got worried and came to the ED. Pt attributes his n/v to elevated blood pressure, states that he never had abdominal pain, the n/v stopped yestrday. Of note, pt states that he routinely gets abdominal ultrasounds by his doctor, most recent a few weeks ago and they told him he had some gallstones. Pt had a cholecystectomy many years ago. Pt denies alcohol use.     In the ED, T 96.5, /89, HR 50, RR18  labs significant for , , T bili 2, D bili .4, alk phos 457. CT Head negative for acute pathology.    Patient was given fluids and admitted for further eval of transaminitis and bp control.     During hospitalization, patient was evaluated by GI, had multiple imaging studies done, and underwent an ERCP which showed dilated CBD with smooth distal CBD stricture most likely inflammatory. Multiple sweeps were made with removal of sludge. Furthermore, patient had his bp controlled on an adjusted medication regimen.     Patient ultimately remained stable, had diet advanced, bp controlled and lab work improved. Patient subsequently cleared for discharge with outpatient followup. 73 y/o M with HTN, ulcerative colitis, cholecystectomy, presents with elevated blood pressure. Two days ago pt started to have headaches and nausea/vomiting which he attributes to elevated blood pressure, states that highest it was at home was 160, wouldn't come down for two days, got worried and came to the ED. Pt attributes his n/v to elevated blood pressure, states that he never had abdominal pain, the n/v stopped yestrday. Of note, pt states that he routinely gets abdominal ultrasounds by his doctor, most recent a few weeks ago and they told him he had some gallstones. Pt had a cholecystectomy many years ago. Pt denies alcohol use.     In the ED, T 96.5, /89, HR 50, RR18  labs significant for , , T bili 2, D bili .4, alk phos 457. CT Head negative for acute pathology.    Patient was given fluids and admitted for further eval of transaminitis and bp control.     During hospitalization, patient was evaluated by GI, had multiple imaging studies done, and underwent an ERCP which showed dilated CBD with smooth distal CBD stricture most likely inflammatory. Multiple sweeps were made with removal of sludge. Furthermore, patient had his bp controlled on an adjusted medication regimen.     Patient ultimately remained stable, had diet advanced, bp controlled and lab work improved. Patient subsequently cleared for discharge with outpatient followup.

## 2022-11-19 NOTE — DISCHARGE NOTE PROVIDER - NSDCCPCAREPLAN_GEN_ALL_CORE_FT
PRINCIPAL DISCHARGE DIAGNOSIS  Diagnosis: Transaminitis  Assessment and Plan of Treatment: You were evluated by the GI team, had mulitple imaging studies done, including CT and US of abdomen, and underwent an ERCP which showed  a dilated CBD with smooth distal CBD stricture along w some GB sludge which was removed. You have since remained stable and ultimately cleared for discharged with outpatient GI follow up for further evaluation and management, including to further review the results of the ERCP.      SECONDARY DISCHARGE DIAGNOSES  Diagnosis: Hypertension  Assessment and Plan of Treatment: Your blood pressure was controlled on an adjuste dmedication regimen. Please continue this regimen after discharge and follow up with your primary care doctor for futher evaluation, including repeat blood pressurse.     PRINCIPAL DISCHARGE DIAGNOSIS  Diagnosis: Transaminitis  Assessment and Plan of Treatment: You were evluated by the GI team, had mulitple imaging studies done, including CT and US of abdomen, and underwent an ERCP which showed  a dilated CBD with smooth distal CBD stricture along w some GB sludge which was removed. You have since remained stable and ultimately cleared for discharged with outpatient GI follow up for further evaluation and management, including to further review the results of the ERCP.  MRCP was normal and no acute infection   Please follow up with Dr Tate in the Clinic on December 8th 2022      SECONDARY DISCHARGE DIAGNOSES  Diagnosis: Hypertension  Assessment and Plan of Treatment: Your blood pressure was controlled on an adjuste dmedication regimen. Please continue this regimen after discharge and follow up with your primary care doctor for futher evaluation, including repeat blood pressurse.

## 2022-11-20 ENCOUNTER — RESULT REVIEW (OUTPATIENT)
Age: 72
End: 2022-11-20

## 2022-11-20 LAB
ALBUMIN SERPL ELPH-MCNC: 3.9 G/DL — SIGNIFICANT CHANGE UP (ref 3.5–5.2)
ALP SERPL-CCNC: 979 U/L — HIGH (ref 30–115)
ALT FLD-CCNC: 576 U/L — HIGH (ref 0–41)
ANION GAP SERPL CALC-SCNC: 9 MMOL/L — SIGNIFICANT CHANGE UP (ref 7–14)
AST SERPL-CCNC: 592 U/L — HIGH (ref 0–41)
AUTO DIFF PNL BLD: ABNORMAL
BILIRUB SERPL-MCNC: 3.3 MG/DL — HIGH (ref 0.2–1.2)
BUN SERPL-MCNC: 14 MG/DL — SIGNIFICANT CHANGE UP (ref 10–20)
C-ANCA SER-ACNC: NEGATIVE — SIGNIFICANT CHANGE UP
CALCIUM SERPL-MCNC: 9.7 MG/DL — SIGNIFICANT CHANGE UP (ref 8.4–10.5)
CHLORIDE SERPL-SCNC: 101 MMOL/L — SIGNIFICANT CHANGE UP (ref 98–110)
CO2 SERPL-SCNC: 28 MMOL/L — SIGNIFICANT CHANGE UP (ref 17–32)
CREAT SERPL-MCNC: 1 MG/DL — SIGNIFICANT CHANGE UP (ref 0.7–1.5)
EGFR: 80 ML/MIN/1.73M2 — SIGNIFICANT CHANGE UP
GLUCOSE SERPL-MCNC: 92 MG/DL — SIGNIFICANT CHANGE UP (ref 70–99)
HCT VFR BLD CALC: 47.3 % — SIGNIFICANT CHANGE UP (ref 42–52)
HGB BLD-MCNC: 16 G/DL — SIGNIFICANT CHANGE UP (ref 14–18)
MAGNESIUM SERPL-MCNC: 1.8 MG/DL — SIGNIFICANT CHANGE UP (ref 1.8–2.4)
MCHC RBC-ENTMCNC: 30.8 PG — SIGNIFICANT CHANGE UP (ref 27–31)
MCHC RBC-ENTMCNC: 33.8 G/DL — SIGNIFICANT CHANGE UP (ref 32–37)
MCV RBC AUTO: 91 FL — SIGNIFICANT CHANGE UP (ref 80–94)
NRBC # BLD: 0 /100 WBCS — SIGNIFICANT CHANGE UP (ref 0–0)
P-ANCA SER-ACNC: NEGATIVE — SIGNIFICANT CHANGE UP
PLATELET # BLD AUTO: 275 K/UL — SIGNIFICANT CHANGE UP (ref 130–400)
POTASSIUM SERPL-MCNC: 4.8 MMOL/L — SIGNIFICANT CHANGE UP (ref 3.5–5)
POTASSIUM SERPL-SCNC: 4.8 MMOL/L — SIGNIFICANT CHANGE UP (ref 3.5–5)
PROT SERPL-MCNC: 6.6 G/DL — SIGNIFICANT CHANGE UP (ref 6–8)
RBC # BLD: 5.2 M/UL — SIGNIFICANT CHANGE UP (ref 4.7–6.1)
RBC # FLD: 14.1 % — SIGNIFICANT CHANGE UP (ref 11.5–14.5)
SODIUM SERPL-SCNC: 138 MMOL/L — SIGNIFICANT CHANGE UP (ref 135–146)
WBC # BLD: 8.97 K/UL — SIGNIFICANT CHANGE UP (ref 4.8–10.8)
WBC # FLD AUTO: 8.97 K/UL — SIGNIFICANT CHANGE UP (ref 4.8–10.8)

## 2022-11-20 PROCEDURE — 99233 SBSQ HOSP IP/OBS HIGH 50: CPT

## 2022-11-20 PROCEDURE — 99232 SBSQ HOSP IP/OBS MODERATE 35: CPT

## 2022-11-20 RX ADMIN — Medication 1000 MILLIGRAM(S): at 17:15

## 2022-11-20 RX ADMIN — ENOXAPARIN SODIUM 40 MILLIGRAM(S): 100 INJECTION SUBCUTANEOUS at 06:07

## 2022-11-20 RX ADMIN — Medication 1000 MILLIGRAM(S): at 06:12

## 2022-11-20 RX ADMIN — LISINOPRIL 20 MILLIGRAM(S): 2.5 TABLET ORAL at 06:07

## 2022-11-20 RX ADMIN — PANTOPRAZOLE SODIUM 40 MILLIGRAM(S): 20 TABLET, DELAYED RELEASE ORAL at 06:07

## 2022-11-20 RX ADMIN — Medication 1000 MILLIGRAM(S): at 01:08

## 2022-11-20 RX ADMIN — Medication 1000 MILLIGRAM(S): at 12:01

## 2022-11-20 RX ADMIN — AZATHIOPRINE 50 MILLIGRAM(S): 100 TABLET ORAL at 12:03

## 2022-11-20 RX ADMIN — ESCITALOPRAM OXALATE 10 MILLIGRAM(S): 10 TABLET, FILM COATED ORAL at 12:03

## 2022-11-20 RX ADMIN — ATORVASTATIN CALCIUM 20 MILLIGRAM(S): 80 TABLET, FILM COATED ORAL at 21:10

## 2022-11-20 RX ADMIN — Medication 30 MILLIGRAM(S): at 06:07

## 2022-11-20 RX ADMIN — Medication 1000 MILLIGRAM(S): at 21:39

## 2022-11-20 RX ADMIN — Medication 25 MILLIGRAM(S): at 06:07

## 2022-11-20 RX ADMIN — Medication 1000 MILLIGRAM(S): at 21:20

## 2022-11-20 NOTE — PROGRESS NOTE ADULT - SUBJECTIVE AND OBJECTIVE BOX
Gastroenterology progress note:     Patient is a 72y old  Male who presents with a chief complaint of hypertension (19 Nov 2022 09:57)       Admitted on: 11-15-22    We are following the patient s/p ERCP     No acute events overnight.   no pain   bili slightly increasing     PAST MEDICAL & SURGICAL HISTORY:  HTN (hypertension)      GERD (gastroesophageal reflux disease)      Colitis      H/O pilonidal cyst      History of colon resection          MEDICATIONS  (STANDING):  atorvastatin 20 milliGRAM(s) Oral at bedtime  azaTHIOprine 50 milliGRAM(s) Oral daily  enoxaparin Injectable 40 milliGRAM(s) SubCutaneous every 24 hours  escitalopram 10 milliGRAM(s) Oral daily  lisinopril 20 milliGRAM(s) Oral daily  mesalamine ER Capsule 1000 milliGRAM(s) Oral four times a day  metoprolol succinate ER 25 milliGRAM(s) Oral daily  NIFEdipine XL 30 milliGRAM(s) Oral daily  pantoprazole    Tablet 40 milliGRAM(s) Oral before breakfast  traMADol 25 milliGRAM(s) Oral once    MEDICATIONS  (PRN):  acetaminophen     Tablet .. 1000 milliGRAM(s) Oral every 6 hours PRN Mild Pain (1 - 3)  ondansetron Injectable 4 milliGRAM(s) IV Push every 8 hours PRN Nausea and/or Vomiting  traZODone 50 milliGRAM(s) Oral at bedtime PRN Insomnia      Allergies  No Known Allergies      Review of Systems:   Cardiovascular:  No Chest Pain, No Palpitations  Respiratory:  No Cough, No Dyspnea  Gastrointestinal:  As described in HPI  Skin:  No Skin Lesions, No Jaundice  Neuro:  No Syncope, No Dizziness    Physical Examination:  T(C): 36.7 (11-20-22 @ 13:09), Max: 37.7 (11-20-22 @ 04:15)  HR: 89 (11-20-22 @ 13:09) (89 - 100)  BP: 155/70 (11-20-22 @ 13:09) (141/72 - 155/70)  RR: 18 (11-20-22 @ 13:09) (18 - 18)  SpO2: --      11-19-22 @ 07:01  -  11-20-22 @ 07:00  --------------------------------------------------------  IN: 350 mL / OUT: 0 mL / NET: 350 mL      GENERAL: AAOx3, no acute distress.  HEAD:  Atraumatic, Normocephalic  EYES: conjunctiva and sclera clear  NECK: Supple, no JVD or thyromegaly  CHEST/LUNG: Clear to auscultation bilaterally; No wheeze, rhonchi, or rales  HEART: Regular rate and rhythm; normal S1, S2, No murmurs.  ABDOMEN: Soft, nontender, nondistended; Bowel sounds present  NEUROLOGY: No asterixis or tremor.   SKIN: Intact, no jaundice     Data:                        16.0   8.97  )-----------( 275      ( 20 Nov 2022 07:45 )             47.3     Hgb trend:  16.0  11-20-22 @ 07:45  16.0  11-19-22 @ 07:56  14.4  11-17-22 @ 22:17        11-20    138  |  101  |  14  ----------------------------<  92  4.8   |  28  |  1.0    Ca    9.7      20 Nov 2022 07:45  Mg     1.8     11-20    TPro  6.6  /  Alb  3.9  /  TBili  3.3<H>  /  DBili  x   /  AST  592<H>  /  ALT  576<H>  /  AlkPhos  979<H>  11-20    Liver panel trend:  TBili 3.3   /      /      /   AlkP 979   /   Tptn 6.6   /   Alb 3.9    /   DBili --      11-20  TBili 2.8   /      /      /   AlkP 871   /   Tptn 6.7   /   Alb 4.2    /   DBili --      11-19  TBili 1.4   /      /      /   AlkP 668   /   Tptn 6.3   /   Alb 3.8    /   DBili --      11-17  TBili 4.0   /      /      /   AlkP 736   /   Tptn 6.8   /   Alb 3.9    /   DBili --      11-17  TBili 1.2   /      /      /   AlkP 525   /   Tptn 6.6   /   Alb 3.7    /   DBili 0.4      11-16  TBili 2.2   /      /      /   AlkP 553   /   Tptn 6.9   /   Alb 3.9    /   DBili --      11-15  TBili 2.0   /      /      /   AlkP 457   /   Tptn 6.9   /   Alb 4.0    /   DBili 0.4      11-14  TBili 1.9   /      /      /   AlkP 449   /   Tptn 7.6   /   Alb 3.9    /   DBili --      11-14             Radiology:

## 2022-11-20 NOTE — PROGRESS NOTE ADULT - ASSESSMENT
73 y/o male with PMHx of HTN, ulcerative colitis (, Prior Resection) , cholecystectomy, presents with elevated blood pressure and abnormal LFTs    # Obstructive Jaundice s/p ERCP with sphincterotomy:   Cholangiogram showed dilated CBD with smooth distal CBD stricture most likely inflammatory   Brush cytology was done   Multiple sweeps were made with removal of sludge     Plan:   FU with Pathology  Advance diet as tolerated   Repeat LFTs in AM   Monitor for Post ERCP complication including bleeding and post ERCP pancreatitis.  Incomplete note  73 y/o male with PMHx of HTN, ulcerative colitis (, Prior Resection) , cholecystectomy, presents with elevated blood pressure and abnormal LFTs    # Obstructive Jaundice s/p ERCP with sphincterotomy:   Cholangiogram showed dilated CBD with smooth distal CBD stricture most likely inflammatory   Brush cytology was done   Multiple sweeps were made with removal of sludge     Plan:   FU with Pathology  Advance diet as tolerated   Repeat LFTs in AM   Monitor for Post ERCP complication including bleeding and post ERCP pancreatitis.   71 y/o male with PMHx of HTN, ulcerative colitis (, Prior Resection) , cholecystectomy, presents with elevated blood pressure and abnormal LFTs    # Obstructive Jaundice s/p ERCP with sphincterotomy:   Cholangiogram showed dilated CBD with smooth distal CBD stricture most likely inflammatory   Brush cytology was done   Multiple sweeps were made with removal of sludge     Plan:   FU with Pathology  Advance diet as tolerated   Repeat LFTs in AM   Monitor for Post ERCP complication including bleeding and post ERCP pancreatitis.  [facility] to follow

## 2022-11-20 NOTE — PROGRESS NOTE ADULT - ASSESSMENT
73 y/o M with HTN, ulcerative colitis, cholecystectomy, presents with elevated blood pressure. Found to have transaminitis    #FluctuatingTransaminitis, Cholestatic 2/2 CBD Stricture   -Pt had cholecystectomy many years ago, notes that he recently had outpatient US which showed gallstones  -CT abd no acute pathology  -US abdomen: No CBD dilation  ERCP with sphincterotomy:   Cholangiogram showed dilated CBD with smooth distal CBD stricture most likely inflammatory   - Successfully advanced diet   - Worsening LFTs this morning, discussed with fellow will continue to trend and follow up with Dr. Tate regarding the need for a repeat inpt ERCP     # HTN emergency- improved   - severe headache with onset of HTN  - blood pressure resolved after receiving home meds  - unsure why BP accelerated as patient states he was compliant with his home medications, did not miss dose; no identifying aggrevating factors ie stress, uncontrolled pain, etc  - c/w lisinopril, metoprolol   - add nifedipine , BP better controlled    #Ulcerative colitis  #Hx of colonic resection  -c/w azathioprine 50mg qd  -  home dose is Lialda 4.8g qd, appropriate conversion to Pentasa ( formulary here ) is 1g qid     Dispo: medically acute given uptrending transaminases     Total time spent to complete patient's bedside assessment, review medical chart, discuss medical plan of care with covering medical team was more than 35 minutes  with >50% of time spent face to face with patient, discussion with patient/family and/or coordination of care      Nohemi Pedro DO .      73 y/o M with HTN, ulcerative colitis, cholecystectomy, presents with elevated blood pressure. Found to have transaminitis    #FluctuatingTransaminitis, Cholestatic 2/2 CBD Stricture   -Pt had cholecystectomy many years ago, notes that he recently had outpatient US which showed gallstones  -CT abd no acute pathology  -US abdomen: No CBD dilation  ERCP with sphincterotomy:   Cholangiogram showed dilated CBD with smooth distal CBD stricture most likely inflammatory   - Successfully advanced diet   - Worsening LFTs this morning, discussed with fellow will continue to trend and follow up with Dr. Tate regarding the need for a repeat inpt ERCP     # HTN emergency- improved   - severe headache with onset of HTN  - blood pressure resolved after receiving home meds  - unsure why BP accelerated as patient states he was compliant with his home medications, did not miss dose; no identifying aggrevating factors ie stress, uncontrolled pain, etc  - c/w lisinopril, metoprolol   - add nifedipine , BP better controlled    #Ulcerative colitis  #Hx of colonic resection  -c/w azathioprine 50mg qd  -  home dose is Lialda 4.8g qd, appropriate conversion to Pentasa ( formulary here ) is 1g qid     Dispo: medically acute given uptrending transaminases     Follow Up:   No active order for cytopath, please call path lab in AM to ensure that sample is received     Total time spent to complete patient's bedside assessment, review medical chart, discuss medical plan of care with covering medical team was more than 35 minutes  with >50% of time spent face to face with patient, discussion with patient/family and/or coordination of care      Nohemi Pedro DO .

## 2022-11-20 NOTE — PROGRESS NOTE ADULT - SUBJECTIVE AND OBJECTIVE BOX
SHARONDATAVO  72y, Male  Allergy: No Known Allergies    Hospital Day: 5d    Patient seen and examined earlier today. Feeling well but LFT's continue to worsen.     PMH/PSH:  PAST MEDICAL & SURGICAL HISTORY:  HTN (hypertension)      GERD (gastroesophageal reflux disease)      Colitis      H/O pilonidal cyst      History of colon resection          LAST 24-Hr EVENTS:    VITALS:  T(F): 98 (11-20-22 @ 13:09), Max: 99.8 (11-20-22 @ 04:15)  HR: 89 (11-20-22 @ 13:09)  BP: 155/70 (11-20-22 @ 13:09) (141/72 - 155/70)  RR: 18 (11-20-22 @ 13:09)  SpO2: --        TESTS & MEASUREMENTS:  Weight (Kg):   BMI (kg/m2): 24.2 (11-18)    11-19-22 @ 07:01  -  11-20-22 @ 07:00  --------------------------------------------------------  IN: 350 mL / OUT: 0 mL / NET: 350 mL                            16.0   8.97  )-----------( 275      ( 20 Nov 2022 07:45 )             47.3       11-20    138  |  101  |  14  ----------------------------<  92  4.8   |  28  |  1.0    Ca    9.7      20 Nov 2022 07:45  Mg     1.8     11-20    TPro  6.6  /  Alb  3.9  /  TBili  3.3<H>  /  DBili  x   /  AST  592<H>  /  ALT  576<H>  /  AlkPhos  979<H>  11-20    LIVER FUNCTIONS - ( 20 Nov 2022 07:45 )  Alb: 3.9 g/dL / Pro: 6.6 g/dL / ALK PHOS: 979 U/L / ALT: 576 U/L / AST: 592 U/L / GGT: x                       Ferritin, Serum: 321 ng/mL (11-16-22 @ 05:36)    Serum Pro-Brain Natriuretic Peptide: 248 pg/mL (11-14-22 @ 20:17)    COVID-19 PCR: NotDetec (11-17-22 @ 17:13)  COVID-19 PCR: NotDetec (11-14-22 @ 22:27)      RADIOLOGY, ECG, & ADDITIONAL TESTS:      RECENT DIAGNOSTIC ORDERS:      MEDICATIONS:  MEDICATIONS  (STANDING):  atorvastatin 20 milliGRAM(s) Oral at bedtime  azaTHIOprine 50 milliGRAM(s) Oral daily  enoxaparin Injectable 40 milliGRAM(s) SubCutaneous every 24 hours  escitalopram 10 milliGRAM(s) Oral daily  lisinopril 20 milliGRAM(s) Oral daily  mesalamine ER Capsule 1000 milliGRAM(s) Oral four times a day  metoprolol succinate ER 25 milliGRAM(s) Oral daily  NIFEdipine XL 30 milliGRAM(s) Oral daily  pantoprazole    Tablet 40 milliGRAM(s) Oral before breakfast  traMADol 25 milliGRAM(s) Oral once    MEDICATIONS  (PRN):  acetaminophen     Tablet .. 1000 milliGRAM(s) Oral every 6 hours PRN Mild Pain (1 - 3)  ondansetron Injectable 4 milliGRAM(s) IV Push every 8 hours PRN Nausea and/or Vomiting  traZODone 50 milliGRAM(s) Oral at bedtime PRN Insomnia      HOME MEDICATIONS:  azaTHIOprine 50 mg oral tablet (11-15)  escitalopram 10 mg oral tablet (11-15)  mesalamine 1.2 g oral delayed release tablet (11-15)  metoprolol succinate 25 mg oral tablet, extended release (11-15)  omeprazole 40 mg oral delayed release capsule (01-15)  quinapril 20 mg oral tablet (01-15)  rosuvastatin 5 mg oral tablet (11-15)      PHYSICAL EXAM:  GEN: No acute distress  LUNGS: Clear to auscultation bilaterally , no wheezes ronchi rales  HEART: RRR no MRG   ABD: Soft, non-tender, non-distended.  EXT: warm well perfused no edema   NEURO: AAOX3

## 2022-11-21 LAB
ALBUMIN SERPL ELPH-MCNC: 4 G/DL — SIGNIFICANT CHANGE UP (ref 3.5–5.2)
ALP SERPL-CCNC: 1284 U/L — HIGH (ref 30–115)
ALT FLD-CCNC: 674 U/L — HIGH (ref 0–41)
ANION GAP SERPL CALC-SCNC: 12 MMOL/L — SIGNIFICANT CHANGE UP (ref 7–14)
AST SERPL-CCNC: 701 U/L — HIGH (ref 0–41)
BILIRUB SERPL-MCNC: 3.2 MG/DL — HIGH (ref 0.2–1.2)
BUN SERPL-MCNC: 15 MG/DL — SIGNIFICANT CHANGE UP (ref 10–20)
CALCIUM SERPL-MCNC: 10 MG/DL — SIGNIFICANT CHANGE UP (ref 8.4–10.4)
CHLORIDE SERPL-SCNC: 99 MMOL/L — SIGNIFICANT CHANGE UP (ref 98–110)
CO2 SERPL-SCNC: 25 MMOL/L — SIGNIFICANT CHANGE UP (ref 17–32)
CREAT SERPL-MCNC: 1 MG/DL — SIGNIFICANT CHANGE UP (ref 0.7–1.5)
EGFR: 80 ML/MIN/1.73M2 — SIGNIFICANT CHANGE UP
GLUCOSE SERPL-MCNC: 117 MG/DL — HIGH (ref 70–99)
HCT VFR BLD CALC: 51.6 % — SIGNIFICANT CHANGE UP (ref 42–52)
HGB BLD-MCNC: 17.3 G/DL — SIGNIFICANT CHANGE UP (ref 14–18)
MAGNESIUM SERPL-MCNC: 1.9 MG/DL — SIGNIFICANT CHANGE UP (ref 1.8–2.4)
MCHC RBC-ENTMCNC: 30.8 PG — SIGNIFICANT CHANGE UP (ref 27–31)
MCHC RBC-ENTMCNC: 33.5 G/DL — SIGNIFICANT CHANGE UP (ref 32–37)
MCV RBC AUTO: 92 FL — SIGNIFICANT CHANGE UP (ref 80–94)
NRBC # BLD: 0 /100 WBCS — SIGNIFICANT CHANGE UP (ref 0–0)
PLATELET # BLD AUTO: 345 K/UL — SIGNIFICANT CHANGE UP (ref 130–400)
POTASSIUM SERPL-MCNC: 4.7 MMOL/L — SIGNIFICANT CHANGE UP (ref 3.5–5)
POTASSIUM SERPL-SCNC: 4.7 MMOL/L — SIGNIFICANT CHANGE UP (ref 3.5–5)
PROT SERPL-MCNC: 7.5 G/DL — SIGNIFICANT CHANGE UP (ref 6–8)
RBC # BLD: 5.61 M/UL — SIGNIFICANT CHANGE UP (ref 4.7–6.1)
RBC # FLD: 14.4 % — SIGNIFICANT CHANGE UP (ref 11.5–14.5)
SODIUM SERPL-SCNC: 136 MMOL/L — SIGNIFICANT CHANGE UP (ref 135–146)
WBC # BLD: 9.81 K/UL — SIGNIFICANT CHANGE UP (ref 4.8–10.8)
WBC # FLD AUTO: 9.81 K/UL — SIGNIFICANT CHANGE UP (ref 4.8–10.8)

## 2022-11-21 PROCEDURE — 99233 SBSQ HOSP IP/OBS HIGH 50: CPT

## 2022-11-21 PROCEDURE — 88112 CYTOPATH CELL ENHANCE TECH: CPT | Mod: 26

## 2022-11-21 PROCEDURE — 88305 TISSUE EXAM BY PATHOLOGIST: CPT | Mod: 26

## 2022-11-21 RX ADMIN — LISINOPRIL 20 MILLIGRAM(S): 2.5 TABLET ORAL at 05:32

## 2022-11-21 RX ADMIN — ENOXAPARIN SODIUM 40 MILLIGRAM(S): 100 INJECTION SUBCUTANEOUS at 05:33

## 2022-11-21 RX ADMIN — Medication 1000 MILLIGRAM(S): at 17:14

## 2022-11-21 RX ADMIN — ESCITALOPRAM OXALATE 10 MILLIGRAM(S): 10 TABLET, FILM COATED ORAL at 11:48

## 2022-11-21 RX ADMIN — Medication 1000 MILLIGRAM(S): at 05:35

## 2022-11-21 RX ADMIN — AZATHIOPRINE 50 MILLIGRAM(S): 100 TABLET ORAL at 11:48

## 2022-11-21 RX ADMIN — PANTOPRAZOLE SODIUM 40 MILLIGRAM(S): 20 TABLET, DELAYED RELEASE ORAL at 05:35

## 2022-11-21 RX ADMIN — ATORVASTATIN CALCIUM 20 MILLIGRAM(S): 80 TABLET, FILM COATED ORAL at 21:32

## 2022-11-21 RX ADMIN — Medication 25 MILLIGRAM(S): at 05:32

## 2022-11-21 RX ADMIN — Medication 30 MILLIGRAM(S): at 05:31

## 2022-11-21 RX ADMIN — Medication 1000 MILLIGRAM(S): at 11:46

## 2022-11-21 NOTE — PROGRESS NOTE ADULT - NS ATTEND AMEND GEN_ALL_CORE FT
I agree with above. He may take his Lialda from home. Encouraged him to eat and ambulate. If Levels are not responsive can evaluate again

## 2022-11-21 NOTE — PROGRESS NOTE ADULT - SUBJECTIVE AND OBJECTIVE BOX
SUBJECTIVE:    Patient is a 72y old Male who presents with a chief complaint of hypertension (19 Nov 2022 09:57)    Currently admitted to medicine with the primary diagnosis of Transaminitis       Today is hospital day 6d. This morning he is resting comfortably in bed and reports no new issues or overnight events.     PAST MEDICAL & SURGICAL HISTORY  HTN (hypertension)    GERD (gastroesophageal reflux disease)    Colitis    H/O pilonidal cyst    History of colon resection      SOCIAL HISTORY:  Negative for smoking/alcohol/drug use.     ALLERGIES:  No Known Allergies    MEDICATIONS:  STANDING MEDICATIONS  atorvastatin 20 milliGRAM(s) Oral at bedtime  azaTHIOprine 50 milliGRAM(s) Oral daily  enoxaparin Injectable 40 milliGRAM(s) SubCutaneous every 24 hours  escitalopram 10 milliGRAM(s) Oral daily  lisinopril 20 milliGRAM(s) Oral daily  mesalamine ER Capsule 1000 milliGRAM(s) Oral four times a day  metoprolol succinate ER 25 milliGRAM(s) Oral daily  NIFEdipine XL 30 milliGRAM(s) Oral daily  pantoprazole    Tablet 40 milliGRAM(s) Oral before breakfast  traMADol 25 milliGRAM(s) Oral once    PRN MEDICATIONS  acetaminophen     Tablet .. 1000 milliGRAM(s) Oral every 6 hours PRN  ondansetron Injectable 4 milliGRAM(s) IV Push every 8 hours PRN  traZODone 50 milliGRAM(s) Oral at bedtime PRN    VITALS:   T(F): 97.6  HR: 99  BP: 135/87  RR: 18  SpO2: --    LABS:                        17.3   9.81  )-----------( 345      ( 21 Nov 2022 07:37 )             51.6     11-21    136  |  99  |  15  ----------------------------<  117<H>  4.7   |  25  |  1.0    Ca    10.0      21 Nov 2022 07:37  Mg     1.9     11-21    TPro  7.5  /  Alb  4.0  /  TBili  3.2<H>  /  DBili  x   /  AST  701<H>  /  ALT  674<H>  /  AlkPhos  1284<H>  11-21                  RADIOLOGY:    PHYSICAL EXAM:  GEN: No acute distress  LUNGS: Clear to auscultation bilaterally   HEART: Regular S1S2  ABD: Soft, non-tender, non-distended.  EXT: NC/NC/NE/2+PP/PRICE/Skin Intact.   NEURO: AAOX3    Intravenous access:   NG tube:   Morel Catheter:

## 2022-11-21 NOTE — PROGRESS NOTE ADULT - ASSESSMENT
Patient is a 73 y/o male with PMHx of HTN, ulcerative colitis (, Prior Resection) , cholecystectomy, presents with elevated blood pressure. Patient had ERCP as found to have abnormal liver function studies. Patient had what appeared to look like an inflammatory stricture, duct irrigated. Tolerated procedure well but with up-trending LFT. Patient without current pain. Patient encouraged to eat and ambulate today. Advised after midnight not to have any further food or drink. If LFT not improved tomorrow will plan Endoscopic evaluation.    Abnormal LFT/ UC Hx  - Hep Negative  - ERCP done without stent placement   - Encouraged diet and ambulation today  - NPO after midnight  - Hold Lovenox tomorrow 11/22  - If LFT tomorrow morning are not better will plan Endoscopic evaluation

## 2022-11-21 NOTE — PROGRESS NOTE ADULT - SUBJECTIVE AND OBJECTIVE BOX
Patient is a 73 y/o male with PMHx of HTN, ulcerative colitis (, Prior Resection) , cholecystectomy, presents with elevated blood pressure. Patient had ERCP as found to have abnormal liver function studies. Patient had what appeared to look like an inflammatory stricture, duct irrigated. Tolerated procedure well but with up-trending LFT. Patient without current pain.       PAST MEDICAL & SURGICAL HISTORY:  HTN (hypertension)  GERD (gastroesophageal reflux disease)  Colitis  H/O pilonidal cyst  History of colon resection        MEDICATIONS  (STANDING):  lisinopril 20 milliGRAM(s) Oral daily  metoprolol succinate ER 25 milliGRAM(s) Oral daily  pantoprazole    Tablet 40 milliGRAM(s) Oral before breakfast    MEDICATIONS  (PRN):      Allergies  No Known Allergies        Review of Systems  General:  Denies Fatigue, Denies Fever, Denies Weakness ,Denies Weight Loss   HEENT: Denies Trouble Swallowing ,Denies  Sore Throat , Denies Change in hearing/vision/speech ,Denies Dizziness    Cardio: Denies  Chest Pain , Palpitations    Respiratory: Denies worsening of SOB, Denies Cough  Abdomen: See detailed HPI  Neuro: Denies Headache Denies Dizziness, Denies Paresthesias  MSK: Denies pain in Bones/Joints/Muscles   Psych: Patient denies depression, denies suicidal or homicidal ideations  Integ: Patient Denies rash, or new skin lesions       Vital Signs Last 24 Hrs  T(C): 36.4 (21 Nov 2022 12:09), Max: 36.9 (21 Nov 2022 11:57)  T(F): 97.6 (21 Nov 2022 12:09), Max: 98.4 (21 Nov 2022 11:57)  HR: 99 (21 Nov 2022 12:09) (94 - 99)  BP: 135/87 (21 Nov 2022 12:09) (126/58 - 138/68)  BP(mean): --  RR: 18 (21 Nov 2022 12:09) (18 - 20)  SpO2: --      Physical Exam  Gen: NAD  HEENT: NC/AT, Mucosal Membranes  Cardio: S1/S2 No S3/S4, Regular  Resp: CTA B/L  Abdomen: Soft, ND/NT  Neuro: AAOx3, Cranial Nerve II-XII intact   Extremities: FROM x 4  Skin: No jaundice       Labs:                        17.3   9.81  )-----------( 345      ( 21 Nov 2022 07:37 )             51.6     11-21    136  |  99  |  15  ----------------------------<  117<H>  4.7   |  25  |  1.0    Ca    10.0      21 Nov 2022 07:37  Mg     1.9     11-21    TPro  7.5  /  Alb  4.0  /  TBili  3.2<H>  /  DBili  x   /  AST  701<H>  /  ALT  674<H>  /  AlkPhos  1284<H>  11-21      RADIOLOGY & ADDITIONAL STUDIES:  CT Abdomen and Pelvis w/ IV Cont 11.14.22   IMPRESSION:  1.  No evidence of acute abdominopelvic pathology.

## 2022-11-21 NOTE — PROGRESS NOTE ADULT - ASSESSMENT
71 y/o M with HTN, ulcerative colitis, cholecystectomy, presents with elevated blood pressure. Found to have transaminitis    #Transaminitis - cholestatic  - Pt had cholcystectomy many years ago, notes that he recently had outpatient US which showed gallstones?  - CT abd no acute pathology  - US abdomen: No CBD dilation  - GI ERCP: Cholangiogram showed dilated CBD with smooth distal CBD stricture most likely inflammatory. Multiple sweeps were made with removal of sludge   - LFTs worsen post ERCP however patient is asymptomatic   - GI follow up and recs pending     #HTN crisis - resolved  - BP controlled  -c/w lisinopril  -c/w metoprolol  - Add nifedipine 30mg qd    #Vomiting   - add zofran  - f/u with GI    #Ulcerative colitis  #Hx of colonic resection  -c/w azathioprine 50mg qd  - c/w [id number] mg qid     DVT ppx: lovenox  GI ppx: ppi  Diet: dash  Dispo: Prep for discharge

## 2022-11-22 LAB
ALBUMIN SERPL ELPH-MCNC: 3.9 G/DL — SIGNIFICANT CHANGE UP (ref 3.5–5.2)
ALP SERPL-CCNC: 1186 U/L — HIGH (ref 30–115)
ALT FLD-CCNC: 637 U/L — HIGH (ref 0–41)
ANION GAP SERPL CALC-SCNC: 9 MMOL/L — SIGNIFICANT CHANGE UP (ref 7–14)
AST SERPL-CCNC: 645 U/L — HIGH (ref 0–41)
BILIRUB SERPL-MCNC: 3 MG/DL — HIGH (ref 0.2–1.2)
BUN SERPL-MCNC: 12 MG/DL — SIGNIFICANT CHANGE UP (ref 10–20)
CALCIUM SERPL-MCNC: 9.6 MG/DL — SIGNIFICANT CHANGE UP (ref 8.4–10.5)
CHLORIDE SERPL-SCNC: 101 MMOL/L — SIGNIFICANT CHANGE UP (ref 98–110)
CO2 SERPL-SCNC: 28 MMOL/L — SIGNIFICANT CHANGE UP (ref 17–32)
CREAT SERPL-MCNC: 1 MG/DL — SIGNIFICANT CHANGE UP (ref 0.7–1.5)
EGFR: 80 ML/MIN/1.73M2 — SIGNIFICANT CHANGE UP
GLUCOSE SERPL-MCNC: 109 MG/DL — HIGH (ref 70–99)
HCT VFR BLD CALC: 49.6 % — SIGNIFICANT CHANGE UP (ref 42–52)
HGB BLD-MCNC: 16.8 G/DL — SIGNIFICANT CHANGE UP (ref 14–18)
MAGNESIUM SERPL-MCNC: 1.9 MG/DL — SIGNIFICANT CHANGE UP (ref 1.8–2.4)
MCHC RBC-ENTMCNC: 30.5 PG — SIGNIFICANT CHANGE UP (ref 27–31)
MCHC RBC-ENTMCNC: 33.9 G/DL — SIGNIFICANT CHANGE UP (ref 32–37)
MCV RBC AUTO: 90 FL — SIGNIFICANT CHANGE UP (ref 80–94)
NRBC # BLD: 0 /100 WBCS — SIGNIFICANT CHANGE UP (ref 0–0)
PLATELET # BLD AUTO: 328 K/UL — SIGNIFICANT CHANGE UP (ref 130–400)
POTASSIUM SERPL-MCNC: 4.9 MMOL/L — SIGNIFICANT CHANGE UP (ref 3.5–5)
POTASSIUM SERPL-SCNC: 4.9 MMOL/L — SIGNIFICANT CHANGE UP (ref 3.5–5)
PROT SERPL-MCNC: 7.4 G/DL — SIGNIFICANT CHANGE UP (ref 6–8)
RBC # BLD: 5.51 M/UL — SIGNIFICANT CHANGE UP (ref 4.7–6.1)
RBC # FLD: 14.3 % — SIGNIFICANT CHANGE UP (ref 11.5–14.5)
SODIUM SERPL-SCNC: 138 MMOL/L — SIGNIFICANT CHANGE UP (ref 135–146)
WBC # BLD: 9.77 K/UL — SIGNIFICANT CHANGE UP (ref 4.8–10.8)
WBC # FLD AUTO: 9.77 K/UL — SIGNIFICANT CHANGE UP (ref 4.8–10.8)

## 2022-11-22 PROCEDURE — 99233 SBSQ HOSP IP/OBS HIGH 50: CPT

## 2022-11-22 PROCEDURE — 99232 SBSQ HOSP IP/OBS MODERATE 35: CPT

## 2022-11-22 RX ADMIN — ATORVASTATIN CALCIUM 20 MILLIGRAM(S): 80 TABLET, FILM COATED ORAL at 22:02

## 2022-11-22 RX ADMIN — AZATHIOPRINE 50 MILLIGRAM(S): 100 TABLET ORAL at 12:07

## 2022-11-22 RX ADMIN — ESCITALOPRAM OXALATE 10 MILLIGRAM(S): 10 TABLET, FILM COATED ORAL at 12:07

## 2022-11-22 RX ADMIN — PANTOPRAZOLE SODIUM 40 MILLIGRAM(S): 20 TABLET, DELAYED RELEASE ORAL at 06:03

## 2022-11-22 RX ADMIN — Medication 1000 MILLIGRAM(S): at 06:03

## 2022-11-22 RX ADMIN — LISINOPRIL 20 MILLIGRAM(S): 2.5 TABLET ORAL at 06:03

## 2022-11-22 RX ADMIN — Medication 30 MILLIGRAM(S): at 06:03

## 2022-11-22 RX ADMIN — Medication 25 MILLIGRAM(S): at 06:03

## 2022-11-22 RX ADMIN — Medication 1000 MILLIGRAM(S): at 12:09

## 2022-11-22 RX ADMIN — Medication 1000 MILLIGRAM(S): at 17:06

## 2022-11-22 NOTE — PROGRESS NOTE ADULT - ASSESSMENT
71 y/o M with HTN, ulcerative colitis, cholecystectomy, presents with elevated blood pressure. Found to have transaminitis    #Transaminitis - cholestatic  - Pt had cholcystectomy many years ago, notes that he recently had outpatient US which showed gallstones?  - CT abd no acute pathology  - US abdomen: No CBD dilation  - GI ERCP: Cholangiogram showed dilated CBD with smooth distal CBD stricture most likely inflammatory. Multiple sweeps were made with removal of sludge   - LFTs worsened post ERCP however patient is asymptomatic   - repeat LFTs for tomorrow  - discharge if LFTs improving  - GI follow up and recs pending    > LFTs mildly decreased compared to yesterday   > can discharge patient if tolerating PO   > [facility] as [facility]    #HTN crisis - resolved  - BP controlled  -c/w lisinopril  -c/w metoprolol  - c/w nifedipine 30mg qd    #Vomiting   - add zofran  - f/u with GI    #Ulcerative colitis  #Hx of colonic resection  -c/w azathioprine 50mg qd  - c/w [id number] mg qid     DVT ppx: lovenox  GI ppx: ppi  Diet: dash  Dispo: Prep for discharge

## 2022-11-22 NOTE — PROGRESS NOTE ADULT - SUBJECTIVE AND OBJECTIVE BOX
Patient is a 71 y/o male with PMHx of HTN, ulcerative colitis (, Prior Resection) , cholecystectomy, presents with elevated blood pressure. Patient had ERCP as found to have abnormal liver function studies. Patient had what appeared to look like an inflammatory stricture, duct irrigated. Tolerated procedure well. No overnight issues.     PAST MEDICAL & SURGICAL HISTORY:  HTN (hypertension)  GERD (gastroesophageal reflux disease)  Colitis  H/O pilonidal cyst  History of colon resection        MEDICATIONS  (STANDING):  lisinopril 20 milliGRAM(s) Oral daily  metoprolol succinate ER 25 milliGRAM(s) Oral daily  pantoprazole    Tablet 40 milliGRAM(s) Oral before breakfast    MEDICATIONS  (PRN):      Allergies  No Known Allergies        Review of Systems  General:  Denies Fatigue, Denies Fever, Denies Weakness ,Denies Weight Loss   HEENT: Denies Trouble Swallowing ,Denies  Sore Throat , Denies Change in hearing/vision/speech ,Denies Dizziness    Cardio: Denies  Chest Pain , Palpitations    Respiratory: Denies worsening of SOB, Denies Cough  Abdomen: See detailed HPI  Neuro: Denies Headache Denies Dizziness, Denies Paresthesias  MSK: Denies pain in Bones/Joints/Muscles   Psych: Patient denies depression, denies suicidal or homicidal ideations  Integ: Patient Denies rash, or new skin lesions       Vital Signs Last 24 Hrs  T(C): 36.3 (22 Nov 2022 12:18), Max: 36.6 (21 Nov 2022 20:01)  T(F): 97.3 (22 Nov 2022 12:18), Max: 97.9 (21 Nov 2022 20:01)  HR: 91 (22 Nov 2022 12:18) (91 - 98)  BP: 132/60 (22 Nov 2022 12:18) (131/62 - 145/67)  BP(mean): --  RR: 18 (22 Nov 2022 12:18) (18 - 18)  SpO2: 99% (22 Nov 2022 04:30) (99% - 99%)    Parameters below as of 22 Nov 2022 04:30  Patient On (Oxygen Delivery Method): room air        Physical Exam  Gen: NAD  HEENT: NC/AT, Mucosal Membranes  Cardio: S1/S2 No S3/S4, Regular  Resp: CTA B/L  Abdomen: Soft, ND/NT  Neuro: AAOx3, Cranial Nerve II-XII intact   Extremities: FROM x 4  Skin: No jaundice       Labs:                        16.8   9.77  )-----------( 328      ( 22 Nov 2022 08:45 )             49.6     11-22    138  |  101  |  12  ----------------------------<  109<H>  4.9   |  28  |  1.0    Ca    9.6      22 Nov 2022 08:45  Mg     1.9     11-22    TPro  7.4  /  Alb  3.9  /  TBili  3.0<H>  /  DBili  x   /  AST  645<H>  /  ALT  637<H>  /  AlkPhos  1186<H>  11-22        RADIOLOGY & ADDITIONAL STUDIES:  CT Abdomen and Pelvis w/ IV Cont 11.14.22   IMPRESSION:  1.  No evidence of acute abdominopelvic pathology.

## 2022-11-22 NOTE — PROGRESS NOTE ADULT - SUBJECTIVE AND OBJECTIVE BOX
SUBJECTIVE:    Patient is a 72y old Male who presents with a chief complaint of hypertension (19 Nov 2022 09:57)    Currently admitted to medicine with the primary diagnosis of Transaminitis       Today is hospital day 7d. This morning he is resting comfortably in bed and reports no new issues or overnight events.     PAST MEDICAL & SURGICAL HISTORY  HTN (hypertension)    GERD (gastroesophageal reflux disease)    Colitis    H/O pilonidal cyst    History of colon resection      SOCIAL HISTORY:  Negative for smoking/alcohol/drug use.     ALLERGIES:  No Known Allergies    MEDICATIONS:  STANDING MEDICATIONS  atorvastatin 20 milliGRAM(s) Oral at bedtime  azaTHIOprine 50 milliGRAM(s) Oral daily  escitalopram 10 milliGRAM(s) Oral daily  lisinopril 20 milliGRAM(s) Oral daily  mesalamine ER Capsule 1000 milliGRAM(s) Oral four times a day  metoprolol succinate ER 25 milliGRAM(s) Oral daily  NIFEdipine XL 30 milliGRAM(s) Oral daily  pantoprazole    Tablet 40 milliGRAM(s) Oral before breakfast  traMADol 25 milliGRAM(s) Oral once    PRN MEDICATIONS  acetaminophen     Tablet .. 1000 milliGRAM(s) Oral every 6 hours PRN  ondansetron Injectable 4 milliGRAM(s) IV Push every 8 hours PRN  traZODone 50 milliGRAM(s) Oral at bedtime PRN    VITALS:   T(F): 97.3  HR: 91  BP: 132/60  RR: 18  SpO2: 99%    LABS:                        16.8   9.77  )-----------( 328      ( 22 Nov 2022 08:45 )             49.6     11-22    138  |  101  |  12  ----------------------------<  109<H>  4.9   |  28  |  1.0    Ca    9.6      22 Nov 2022 08:45  Mg     1.9     11-22    TPro  7.4  /  Alb  3.9  /  TBili  3.0<H>  /  DBili  x   /  AST  645<H>  /  ALT  637<H>  /  AlkPhos  1186<H>  11-22      RADIOLOGY:    PHYSICAL EXAM:  GEN: No acute distress  LUNGS: Clear to auscultation bilaterally   HEART: Regular  ABD: Soft, non-tender, non-distended.  EXT: NC/NC/NE/2+PP/PRICE/Skin Intact.   NEURO: AAOX3    Intravenous access:   NG tube:   Morel Catheter:

## 2022-11-22 NOTE — PROGRESS NOTE ADULT - ASSESSMENT
Patient is a 73 y/o male with PMHx of HTN, ulcerative colitis (, Prior Resection) , cholecystectomy, presents with elevated blood pressure. Patient had ERCP as found to have abnormal liver function studies. Patient had what appeared to look like an inflammatory stricture, duct irrigated.  Patient without current pain. Patient encouraged to eat and ambulate today. LFT now downtrending. Can discharge home. Follow up arranged December 8th with [facility]. Advised him to return for any fever, chills, abdominal pain, nausea, or if jaundice returns. He was advised importance of follow keeping [facility] follow up.      Abnormal LFT/ UC Hx  - Hep Negative  - ERCP done without stent placement   - Encouraged diet and ambulation today  - May discharge home today  - Follow up Set Thursday December 8th with [facility], discussed with him the importance of follow up which he agrees and verbalizes understanding  - Advised to return to ER if he develops fever, chills, abdominal pain, nausea, or if jaundice return Patient is a 71 y/o male with PMHx of HTN, ulcerative colitis (, Prior Resection) , cholecystectomy, presents with elevated blood pressure. Patient had ERCP as found to have abnormal liver function studies. Patient had what appeared to look like an inflammatory stricture, duct irrigated.  Patient without current pain. Patient encouraged to eat and ambulate today. LFT now downtrending. Can discharge home. Follow up arranged December 8th with [facility]. Advised him to return for any fever, chills, abdominal pain, nausea, or if jaundice returns. He was advised importance of follow keeping [facility] follow up.      Abnormal LFT/ UC Hx  - Hep Negative  - ERCP , brushing, no stent placement   - Encouraged diet and ambulation today  - May discharge home today  - Follow up Set Thursday December 8th with [facility], discussed with him the importance of follow up which he agrees and verbalizes understanding  - Advised to return to ER if he develops fever, chills, abdominal pain, nausea, or if jaundice return

## 2022-11-23 LAB
ALBUMIN SERPL ELPH-MCNC: 3.7 G/DL — SIGNIFICANT CHANGE UP (ref 3.5–5.2)
ALP SERPL-CCNC: 1451 U/L — HIGH (ref 30–115)
ALT FLD-CCNC: 592 U/L — HIGH (ref 0–41)
ANION GAP SERPL CALC-SCNC: 9 MMOL/L — SIGNIFICANT CHANGE UP (ref 7–14)
AST SERPL-CCNC: 675 U/L — HIGH (ref 0–41)
BILIRUB SERPL-MCNC: 3 MG/DL — HIGH (ref 0.2–1.2)
BUN SERPL-MCNC: 15 MG/DL — SIGNIFICANT CHANGE UP (ref 10–20)
CALCIUM SERPL-MCNC: 9.3 MG/DL — SIGNIFICANT CHANGE UP (ref 8.4–10.5)
CHLORIDE SERPL-SCNC: 98 MMOL/L — SIGNIFICANT CHANGE UP (ref 98–110)
CO2 SERPL-SCNC: 27 MMOL/L — SIGNIFICANT CHANGE UP (ref 17–32)
CREAT SERPL-MCNC: 1.1 MG/DL — SIGNIFICANT CHANGE UP (ref 0.7–1.5)
EGFR: 71 ML/MIN/1.73M2 — SIGNIFICANT CHANGE UP
GLUCOSE SERPL-MCNC: 131 MG/DL — HIGH (ref 70–99)
HCT VFR BLD CALC: 48.3 % — SIGNIFICANT CHANGE UP (ref 42–52)
HGB BLD-MCNC: 16.3 G/DL — SIGNIFICANT CHANGE UP (ref 14–18)
MCHC RBC-ENTMCNC: 30.1 PG — SIGNIFICANT CHANGE UP (ref 27–31)
MCHC RBC-ENTMCNC: 33.7 G/DL — SIGNIFICANT CHANGE UP (ref 32–37)
MCV RBC AUTO: 89.1 FL — SIGNIFICANT CHANGE UP (ref 80–94)
NRBC # BLD: 0 /100 WBCS — SIGNIFICANT CHANGE UP (ref 0–0)
PLATELET # BLD AUTO: 358 K/UL — SIGNIFICANT CHANGE UP (ref 130–400)
POTASSIUM SERPL-MCNC: 4.5 MMOL/L — SIGNIFICANT CHANGE UP (ref 3.5–5)
POTASSIUM SERPL-SCNC: 4.5 MMOL/L — SIGNIFICANT CHANGE UP (ref 3.5–5)
PROT SERPL-MCNC: 7.2 G/DL — SIGNIFICANT CHANGE UP (ref 6–8)
RBC # BLD: 5.42 M/UL — SIGNIFICANT CHANGE UP (ref 4.7–6.1)
RBC # FLD: 14.4 % — SIGNIFICANT CHANGE UP (ref 11.5–14.5)
SODIUM SERPL-SCNC: 134 MMOL/L — LOW (ref 135–146)
WBC # BLD: 10.6 K/UL — SIGNIFICANT CHANGE UP (ref 4.8–10.8)
WBC # FLD AUTO: 10.6 K/UL — SIGNIFICANT CHANGE UP (ref 4.8–10.8)

## 2022-11-23 PROCEDURE — 99233 SBSQ HOSP IP/OBS HIGH 50: CPT

## 2022-11-23 PROCEDURE — 74183 MRI ABD W/O CNTR FLWD CNTR: CPT | Mod: 26

## 2022-11-23 RX ORDER — MESALAMINE 400 MG
1.2 TABLET, DELAYED RELEASE (ENTERIC COATED) ORAL
Refills: 0 | Status: DISCONTINUED | OUTPATIENT
Start: 2022-11-23 | End: 2022-11-24

## 2022-11-23 RX ADMIN — ATORVASTATIN CALCIUM 20 MILLIGRAM(S): 80 TABLET, FILM COATED ORAL at 22:36

## 2022-11-23 RX ADMIN — ESCITALOPRAM OXALATE 10 MILLIGRAM(S): 10 TABLET, FILM COATED ORAL at 11:49

## 2022-11-23 RX ADMIN — Medication 30 MILLIGRAM(S): at 07:13

## 2022-11-23 RX ADMIN — Medication 25 MILLIGRAM(S): at 07:13

## 2022-11-23 RX ADMIN — PANTOPRAZOLE SODIUM 40 MILLIGRAM(S): 20 TABLET, DELAYED RELEASE ORAL at 07:13

## 2022-11-23 RX ADMIN — AZATHIOPRINE 50 MILLIGRAM(S): 100 TABLET ORAL at 11:49

## 2022-11-23 RX ADMIN — LISINOPRIL 20 MILLIGRAM(S): 2.5 TABLET ORAL at 07:13

## 2022-11-23 NOTE — PROGRESS NOTE ADULT - ATTENDING COMMENTS
71 y/o M with HTN, ulcerative colitis, cholecystectomy, presents with elevated blood pressure. Found to have transaminitis    #Transaminitis - improving  -, , T bili 2, D bili .4, alk phos 457  -did have n/v yesterday which resolved, never had abdominal pain, currently asymptomatic  -Pt had cholcystectomy many years ago, notes that he recently had outpatient US which showed gallstones  -CT abd no acute pathology  -US abdomen: No CBD dilation  - GI recs appreciated: pending CLD workup, possible MRCP IP vs OP      # HTN emergency- improved   - severe headache with onset of HTN  - blood pressure resolved after receiving home meds  - unsure why BP accelerated as patient states he was compliant with his home medications, did not miss dose; no identifying aggrevating factors ie stress, uncontrolled pain, etc  - c/w lisinopril, metoprolol   - add nifedipine   - hydral prn    #Ulcerative colitis  #Hx of colonic resection  -c/w azathioprine 50mg qd  -  home dose is Lialda 4.8g qd, appropriate conversion to Pentasa ( formulary here ) is 1g qid       Total time spent to complete patient's bedside assessment, review medical chart, discuss medical plan of care with covering medical team was more than 35 minutes  with >50% of time spent face to face with patient, discussion with patient/family and/or coordination of care      Nohemi Pedro DO
71 y/o M with HTN, ulcerative colitis, cholecystectomy, presents with elevated blood pressure. Found to have transaminitis    #Worsening Transaminitis s/p recent ERCP, Cholestatic 2/2 CBD Stricture   -Pt had cholecystectomy many years ago, notes that he recently had outpatient US which showed gallstones  -CT abd no acute pathology  -US abdomen: No CBD dilation  ERCP with sphincterotomy:   Cholangiogram showed dilated CBD with smooth distal CBD stricture most likely inflammatory   - Successfully advanced diet   - Worsening LFTs for last 3 days, [facility] following possible repeat ERCP     # HTN emergency- improved   - severe headache with onset of HTN  - blood pressure resolved after receiving home meds  - unsure why BP accelerated as patient states he was compliant with his home medications, did not miss dose; no identifying aggrevating factors ie stress, uncontrolled pain, etc  - c/w lisinopril, metoprolol   - add nifedipine , BP better controlled    #Ulcerative colitis  #Hx of colonic resection  -c/w azathioprine 50mg qd  -  home dose is Lialda 4.8g qd, appropriate conversion to Pentasa ( formulary here ) is 1g qid   - pts wife to bring in home med     Dispo: medically acute given uptrending transaminases     Follow Up:   No active order for cytopath, will need to confirm with lab    Total time spent to complete patient's bedside assessment, review medical chart, discuss medical plan of care with covering medical team was more than 35 minutes  with >50% of time spent face to face with patient, discussion with patient/family and/or coordination of care      Nohemi Pedro DO .
71 y/o M with HTN, ulcerative colitis, cholecystectomy, presents with elevated blood pressure. Found to have transaminitis    #Worsening Transaminitis s/p recent ERCP, Cholestatic 2/2 CBD Stricture   -Pt had cholecystectomy many years ago, notes that he recently had outpatient US which showed gallstones  -CT abd no acute pathology  -US abdomen: No CBD dilation  ERCP with sphincterotomy:   Cholangiogram showed dilated CBD with smooth distal CBD stricture most likely inflammatory   - Successfully advanced diet   - Worsening LFTs for last few days, today with very mild improvement. GI no longer planning for repeat ERCP given mild improvement. Will monitor LFTs for one additional day and if stable tomorrow, plan for dc home with close OP follow up.   - Of note, brush cytology was done during procedure, but sample seems to not be in lab, advised [facility] x2 , they can follow up with path lab for further information    # HTN emergency- improved   - severe headache with onset of HTN  - blood pressure resolved after receiving home meds  - unsure why BP accelerated as patient states he was compliant with his home medications, did not miss dose; no identifying aggrevating factors ie stress, uncontrolled pain, etc  - c/w lisinopril, metoprolol   - add nifedipine , BP better controlled    #Ulcerative colitis  #Hx of colonic resection  -c/w azathioprine 50mg qd  - resume pts home Lialda ( nonform )     Dispo: anticipate dc tomorrow    Follow Up:   No active order for cytopath, I spoke with Lab who said they would look out for specimen, but GI will need to further address this as an outpatient     Total time spent to complete patient's bedside assessment, review medical chart, discuss medical plan of care with covering medical team was more than 35 minutes  with >50% of time spent face to face with patient, discussion with patient/family and/or coordination of care      Nohemi Pedro DO .
73 y/o M with HTN, ulcerative colitis, cholecystectomy, presents with elevated blood pressure. Found to have transaminitis    #Transaminitis, Cholestatic 2/2 CBD Stricture   -Pt had cholecystectomy many years ago, notes that he recently had outpatient US which showed gallstones  -CT abd no acute pathology  -US abdomen: No CBD dilation  ERCP with sphincterotomy:   Cholangiogram showed dilated CBD with smooth distal CBD stricture most likely inflammatory   - Successfully advanced diet   - Worsening LFTs and WBC this am, will monitor for improvement prior to dc  - follow up with GI     # HTN emergency- improved   - severe headache with onset of HTN  - blood pressure resolved after receiving home meds  - unsure why BP accelerated as patient states he was compliant with his home medications, did not miss dose; no identifying aggrevating factors ie stress, uncontrolled pain, etc  - c/w lisinopril, metoprolol   - add nifedipine , BP better controlled    #Ulcerative colitis  #Hx of colonic resection  -c/w azathioprine 50mg qd  -  home dose is Lialda 4.8g qd, appropriate conversion to Pentasa ( formulary here ) is 1g qid     Dispo: ant dc tomorrow     Total time spent to complete patient's bedside assessment, review medical chart, discuss medical plan of care with covering medical team was more than 35 minutes  with >50% of time spent face to face with patient, discussion with patient/family and/or coordination of care      Nohemi Pedro DO .
71 y/o M with HTN, ulcerative colitis, cholecystectomy, presents with elevated blood pressure. Found to have transaminitis    #Transaminitis - worsening   -, , T bili 2, D bili .4, alk phos 457  -did have n/v yesterday which resolved, never had abdominal pain, currently asymptomatic  -Pt had cholecystectomy many years ago, notes that he recently had outpatient US which showed gallstones  -CT abd no acute pathology  -US abdomen: No CBD dilation  - GI recs appreciated: requested MRCP, planning for ERCP      # HTN emergency- improved   - severe headache with onset of HTN  - blood pressure resolved after receiving home meds  - unsure why BP accelerated as patient states he was compliant with his home medications, did not miss dose; no identifying aggrevating factors ie stress, uncontrolled pain, etc  - c/w lisinopril, metoprolol   - add nifedipine , BP better controlled today   - hydral prn    #Ulcerative colitis  #Hx of colonic resection  -c/w azathioprine 50mg qd  -  home dose is Lialda 4.8g qd, appropriate conversion to Pentasa ( formulary here ) is 1g qid       Total time spent to complete patient's bedside assessment, review medical chart, discuss medical plan of care with covering medical team was more than 35 minutes  with >50% of time spent face to face with patient, discussion with patient/family and/or coordination of care      Nohemi Pedro DO .
Awaiting CLD work up. Consider MRI/MRCP Op vs ip
I edited the note
***My note supersedes any discrepancies that may be above in the residents note***    73 y/o M with HTN, ulcerative colitis, cholecystectomy, presents with elevated blood pressure. Found to have transaminitis    #Transaminitis   #worsening LFTs post ERCP   # R/O DILI  - Pt had cholcystectomy many years ago, notes that he recently had outpatient US which showed gallstones?  - CT abd no acute pathology  - US abdomen: No CBD dilation  - GI ERCP: Cholangiogram showed dilated CBD with smooth distal CBD stricture most likely inflammatory. Multiple sweeps were made with removal of sludge   - LFTs still  worsened post ERCP however patient is asymptomatic   - GI follow up and recs pending    > LFTs mildly decreased compared to yesterday   > can discharge patient    > [facility]u [facility] out[facility] (appointment on December 8)  - Consult hepatology    #HTN crisis - resolved  - BP controlled  -c/w lisinopril  -c/w metoprolol  - c/w nifedipine 30mg qd    #Vomiting - Resolved  - add zofran    #Ulcerative colitis  #Hx of colonic resection  -c/w azathioprine 50mg qd  - c/w mesalamine 1000 mg qid     DVT ppx: lovenox  GI ppx: ppi  Diet: dash    #Progress Note Handoff  Pending (specify):  Hepatology c/s  Family discussion: updated  Disposition: anticipate home today vs tomorrow after hepatology clears for discharge

## 2022-11-23 NOTE — PROGRESS NOTE ADULT - ASSESSMENT
73 y/o M with HTN, ulcerative colitis, cholecystectomy, presents with elevated blood pressure. Found to have transaminitis    #Transaminitis   #worsening LFTs post ERCP   # R/O DILI  - Pt had cholcystectomy many years ago, notes that he recently had outpatient US which showed gallstones?  - CT abd no acute pathology  - US abdomen: No CBD dilation  - GI ERCP: Cholangiogram showed dilated CBD with smooth distal CBD stricture most likely inflammatory. Multiple sweeps were made with removal of sludge   - LFTs still risio worsened post ERCP however patient is asymptomatic   - GI follow up and recs pending    > LFTs mildly decreased compared to yesterday   > can discharge patient    > [facility]u [facility] as out[facility] (appointment on December 8)  - Consult hepatology    #HTN crisis - resolved  - BP controlled  -c/w lisinopril  -c/w metoprolol  - c/w nifedipine 30mg qd    #Vomiting - Resolved  - add zofran    #Ulcerative colitis  #Hx of colonic resection  -c/w azathioprine 50mg qd  - c/w mesalamine 1000 mg qid     DVT ppx: lovenox  GI ppx: ppi  Diet: dash  Dispo: home no needs

## 2022-11-23 NOTE — PROGRESS NOTE ADULT - SUBJECTIVE AND OBJECTIVE BOX
SUBJECTIVE:    Patient is a 72y old Male who presents with a chief complaint of hypertension (19 Nov 2022 09:57)    Currently admitted to medicine with the primary diagnosis of Transaminitis       Today is hospital day 8d. This morning he is resting comfortably in bed and reports no new issues or overnight events.     PAST MEDICAL & SURGICAL HISTORY  HTN (hypertension)    GERD (gastroesophageal reflux disease)    Colitis    H/O pilonidal cyst    History of colon resection      SOCIAL HISTORY:  Negative for smoking/alcohol/drug use.     ALLERGIES:  No Known Allergies    MEDICATIONS:  STANDING MEDICATIONS  atorvastatin 20 milliGRAM(s) Oral at bedtime  azaTHIOprine 50 milliGRAM(s) Oral daily  escitalopram 10 milliGRAM(s) Oral daily  lisinopril 20 milliGRAM(s) Oral daily  mesalamine ER Capsule 1000 milliGRAM(s) Oral four times a day  metoprolol succinate ER 25 milliGRAM(s) Oral daily  NIFEdipine XL 30 milliGRAM(s) Oral daily  pantoprazole    Tablet 40 milliGRAM(s) Oral before breakfast  traMADol 25 milliGRAM(s) Oral once    PRN MEDICATIONS  acetaminophen     Tablet .. 1000 milliGRAM(s) Oral every 6 hours PRN  ondansetron Injectable 4 milliGRAM(s) IV Push every 8 hours PRN  traZODone 50 milliGRAM(s) Oral at bedtime PRN    VITALS:   T(F): 97.6  HR: 94  BP: 105/56  RR: 18  SpO2: --    LABS:                        16.3   10.60 )-----------( 358      ( 23 Nov 2022 08:21 )             48.3     11-23    134<L>  |  98  |  15  ----------------------------<  131<H>  4.5   |  27  |  1.1    Ca    9.3      23 Nov 2022 08:21  Mg     1.9     11-22    TPro  7.2  /  Alb  3.7  /  TBili  3.0<H>  /  DBili  x   /  AST  675<H>  /  ALT  592<H>  /  AlkPhos  1451<H>  11-23      PHYSICAL EXAM:  GEN: No acute distress  LUNGS: Clear to auscultation bilaterally   HEART: Regular  ABD: Soft, non-tender, non-distended.  EXT: NC/NC/NE/2+PP/PRICE/Skin Intact.   NEURO: AAOX3    Intravenous access:   NG tube:   Morel Catheter:

## 2022-11-23 NOTE — PROGRESS NOTE ADULT - PROVIDER SPECIALTY LIST ADULT
Gastroenterology
Gastroenterology
Internal Medicine
Gastroenterology
Internal Medicine
Gastroenterology

## 2022-11-24 ENCOUNTER — TRANSCRIPTION ENCOUNTER (OUTPATIENT)
Age: 72
End: 2022-11-24

## 2022-11-24 VITALS
SYSTOLIC BLOOD PRESSURE: 134 MMHG | DIASTOLIC BLOOD PRESSURE: 70 MMHG | RESPIRATION RATE: 18 BRPM | TEMPERATURE: 97 F | OXYGEN SATURATION: 97 % | HEART RATE: 85 BPM

## 2022-11-24 LAB
ALBUMIN SERPL ELPH-MCNC: 3.8 G/DL — SIGNIFICANT CHANGE UP (ref 3.5–5.2)
ALP SERPL-CCNC: 1473 U/L — HIGH (ref 30–115)
ALT FLD-CCNC: 564 U/L — HIGH (ref 0–41)
ANION GAP SERPL CALC-SCNC: 10 MMOL/L — SIGNIFICANT CHANGE UP (ref 7–14)
APTT BLD: 31.5 SEC — SIGNIFICANT CHANGE UP (ref 27–39.2)
AST SERPL-CCNC: 675 U/L — HIGH (ref 0–41)
BILIRUB SERPL-MCNC: 3.4 MG/DL — HIGH (ref 0.2–1.2)
BUN SERPL-MCNC: 14 MG/DL — SIGNIFICANT CHANGE UP (ref 10–20)
CALCIUM SERPL-MCNC: 9.1 MG/DL — SIGNIFICANT CHANGE UP (ref 8.4–10.5)
CHLORIDE SERPL-SCNC: 101 MMOL/L — SIGNIFICANT CHANGE UP (ref 98–110)
CO2 SERPL-SCNC: 26 MMOL/L — SIGNIFICANT CHANGE UP (ref 17–32)
CREAT SERPL-MCNC: 1.1 MG/DL — SIGNIFICANT CHANGE UP (ref 0.7–1.5)
EGFR: 71 ML/MIN/1.73M2 — SIGNIFICANT CHANGE UP
GGT SERPL-CCNC: 898 U/L — HIGH (ref 1–40)
GLUCOSE SERPL-MCNC: 103 MG/DL — HIGH (ref 70–99)
HCT VFR BLD CALC: 47.2 % — SIGNIFICANT CHANGE UP (ref 42–52)
HGB BLD-MCNC: 16 G/DL — SIGNIFICANT CHANGE UP (ref 14–18)
INR BLD: 0.97 RATIO — SIGNIFICANT CHANGE UP (ref 0.65–1.3)
MCHC RBC-ENTMCNC: 30.6 PG — SIGNIFICANT CHANGE UP (ref 27–31)
MCHC RBC-ENTMCNC: 33.9 G/DL — SIGNIFICANT CHANGE UP (ref 32–37)
MCV RBC AUTO: 90.2 FL — SIGNIFICANT CHANGE UP (ref 80–94)
NRBC # BLD: 0 /100 WBCS — SIGNIFICANT CHANGE UP (ref 0–0)
PLATELET # BLD AUTO: 375 K/UL — SIGNIFICANT CHANGE UP (ref 130–400)
POTASSIUM SERPL-MCNC: 4.3 MMOL/L — SIGNIFICANT CHANGE UP (ref 3.5–5)
POTASSIUM SERPL-SCNC: 4.3 MMOL/L — SIGNIFICANT CHANGE UP (ref 3.5–5)
PROT SERPL-MCNC: 7 G/DL — SIGNIFICANT CHANGE UP (ref 6–8)
PROTHROM AB SERPL-ACNC: 11.1 SEC — SIGNIFICANT CHANGE UP (ref 9.95–12.87)
RBC # BLD: 5.23 M/UL — SIGNIFICANT CHANGE UP (ref 4.7–6.1)
RBC # FLD: 14.2 % — SIGNIFICANT CHANGE UP (ref 11.5–14.5)
SODIUM SERPL-SCNC: 137 MMOL/L — SIGNIFICANT CHANGE UP (ref 135–146)
WBC # BLD: 9.85 K/UL — SIGNIFICANT CHANGE UP (ref 4.8–10.8)
WBC # FLD AUTO: 9.85 K/UL — SIGNIFICANT CHANGE UP (ref 4.8–10.8)

## 2022-11-24 PROCEDURE — 99239 HOSP IP/OBS DSCHRG MGMT >30: CPT

## 2022-11-24 RX ORDER — NIFEDIPINE 30 MG
1 TABLET, EXTENDED RELEASE 24 HR ORAL
Qty: 30 | Refills: 0
Start: 2022-11-24 | End: 2022-12-23

## 2022-11-24 RX ADMIN — AZATHIOPRINE 50 MILLIGRAM(S): 100 TABLET ORAL at 11:38

## 2022-11-24 RX ADMIN — Medication 1.2 GRAM(S): at 08:58

## 2022-11-24 RX ADMIN — Medication 25 MILLIGRAM(S): at 06:05

## 2022-11-24 RX ADMIN — ESCITALOPRAM OXALATE 10 MILLIGRAM(S): 10 TABLET, FILM COATED ORAL at 11:38

## 2022-11-24 RX ADMIN — PANTOPRAZOLE SODIUM 40 MILLIGRAM(S): 20 TABLET, DELAYED RELEASE ORAL at 06:05

## 2022-11-24 RX ADMIN — Medication 30 MILLIGRAM(S): at 06:05

## 2022-11-24 RX ADMIN — Medication 1.2 GRAM(S): at 11:38

## 2022-11-24 RX ADMIN — LISINOPRIL 20 MILLIGRAM(S): 2.5 TABLET ORAL at 06:05

## 2022-11-24 NOTE — CHART NOTE - NSCHARTNOTEFT_GEN_A_CORE
PACU ANESTHESIA ADMISSION NOTE      Procedure:   Post op diagnosis:      ____  Intubated  TV:______       Rate: ______      FiO2: ______    _x___  Patent Airway    _x___  Full return of protective reflexes    ____  Full recovery from anesthesia / back to baseline status    Vitals: P 95 R 16 /58 T 97.5 SpO2 97%  T(C): 36.8 (11-18-22 @ 13:52), Max: 37.2 (11-18-22 @ 13:15)  HR: 98 (11-18-22 @ 13:52) (81 - 113)  BP: 136/90 (11-18-22 @ 13:52) (130/67 - 160/76)  RR: 18 (11-18-22 @ 13:52) (18 - 18)  SpO2: 99% (11-18-22 @ 13:52) (99% - 99%)    Mental Status:  _x___ Awake   ___x__ Alert   _____ Drowsy   _____ Sedated    Nausea/Vomiting:  _x___  NO       ______Yes,   See Post - Op Orders         Pain Scale (0-10):  __0___    Treatment: _x___ None    ____ See Post - Op/PCA Orders    Post - Operative Fluids:   ____ Oral   ____ See Post - Op Orders    Plan: Discharge:   ____Home       _____Floor     _____Critical Care    _____  Other:_________________    Comments:  No anesthesia issues or complications noted.  Discharge when criteria met.
ERCP with sphincterotomy:   Cholangiogram showed dilated CBD with smooth distal CBD stricture most likely inflammatory   Brush cytology was done   Multiple sweeps were made with removal of sludge     Plan:   FU with Pathology  NPO for now   If no pain in 4hrs then liquid diet today  Advance diet as tolerated in the morning   Monitor CBC and LFTs   Monitor for Post ERCP complication including bleeding and post ERCP pancreatitis
Patient was admitted for transaminitis s/p ERCP with sphincterotomy   Post Procedure patient started to have a rise in LFTs (predominantly ALP) with normal INR and Tbil 3  Hepatology were consulted and MRCP was neg for PSC   >>> Today Discussed with GI Fellow: given the lack of symptoms, good  PO intake and normal MRCP - patient is cleared for DC home     persistent transaminitis suspected to be medication induced at some point during his hospital stay   - Patient will be discharged today   - F/U appt set up with Dr Tate December 8th   - Discussed with patient and he is agreeable to go home

## 2022-11-24 NOTE — DISCHARGE NOTE NURSING/CASE MANAGEMENT/SOCIAL WORK - NSDCPEFALRISK_GEN_ALL_CORE
For information on Fall & Injury Prevention, visit: [email or web address] OR  [email or web address] OR  https://www.cdc.gov/steadi/patient.html

## 2022-11-24 NOTE — DISCHARGE NOTE NURSING/CASE MANAGEMENT/SOCIAL WORK - PATIENT PORTAL LINK FT
You can access the FollowMyHealth Patient Portal offered by [facility] by registering at the following website: [email or web address]. By joining [employer]s FollowMyHealth portal, you will also be able to view your health information using other applications (apps) compatible with our system.

## 2022-11-25 LAB — NON-GYNECOLOGICAL CYTOLOGY STUDY: SIGNIFICANT CHANGE UP

## 2022-12-06 PROBLEM — Z00.00 ENCOUNTER FOR PREVENTIVE HEALTH EXAMINATION: Status: ACTIVE | Noted: 2022-12-06

## 2022-12-08 ENCOUNTER — APPOINTMENT (OUTPATIENT)
Dept: GASTROENTEROLOGY | Facility: CLINIC | Age: 72
End: 2022-12-08

## 2022-12-08 DIAGNOSIS — Z86.39 PERSONAL HISTORY OF OTHER ENDOCRINE, NUTRITIONAL AND METABOLIC DISEASE: ICD-10-CM

## 2022-12-08 DIAGNOSIS — Z78.9 OTHER SPECIFIED HEALTH STATUS: ICD-10-CM

## 2022-12-08 DIAGNOSIS — Z82.3 FAMILY HISTORY OF STROKE: ICD-10-CM

## 2022-12-08 DIAGNOSIS — Z87.09 PERSONAL HISTORY OF OTHER DISEASES OF THE RESPIRATORY SYSTEM: ICD-10-CM

## 2022-12-08 DIAGNOSIS — Z80.0 FAMILY HISTORY OF MALIGNANT NEOPLASM OF DIGESTIVE ORGANS: ICD-10-CM

## 2022-12-08 DIAGNOSIS — Z86.79 PERSONAL HISTORY OF OTHER DISEASES OF THE CIRCULATORY SYSTEM: ICD-10-CM

## 2022-12-08 PROCEDURE — 99442: CPT

## 2022-12-08 NOTE — ASSESSMENT
[FreeTextEntry1] : Patient is a 73 y/o male with PMHx of HTN, ulcerative colitis (, Prior Resection) , cholecystectomy, who presented with elevated blood pressure. Patient had ERCP as found to have abnormal liver function studies. Patient had what appeared to look like an inflammatory stricture, duct irrigated.  CBD brushing without malignant cells.  Since discharge feeling much better.  He notes for return to his appetite, no abdominal pain, no jaundice, and no yellow discoloration of the skin.  Patient follows  for IBD. \par \par Abnormal LFT/ UC Hx\par - Hep Negative\par - ERCP , brushing, no stent placement - Benign epithelial cells\par - MRCP done after ERCP was without ductal abnormalities or findings concerning for PSC\par - LFT ordered, encouraged diet\par - Encouraged to follow Dr. Mendoza for IBD\par - Folllow up 4-6 weeks

## 2022-12-08 NOTE — HISTORY OF PRESENT ILLNESS
[Home] : at home, [unfilled] , at the time of the visit. [Medical Office: ([facility])___] : at the medical office located in  [FreeTextEntry3] : Renea Valdes - Daughter [FreeTextEntry4] : Shaina [FreeTextEntry1] : Patient is a 71 y/o male with PMHx of HTN, ulcerative colitis (, Prior Resection) , cholecystectomy, who presented with elevated blood pressure. Patient had ERCP as found to have abnormal liver function studies. Patient had what appeared to look like an inflammatory stricture, duct irrigated.  CBD brushing without malignant cells.  Since discharge feeling much better.  He notes for return to his appetite, no abdominal pain, no jaundice, and no yellow discoloration of the skin.  Patient follows  for IBD. \par \par  [de-identified] : 11/18/22

## 2023-02-09 LAB
ALBUMIN SERPL ELPH-MCNC: 4.3 G/DL
ALP BLD-CCNC: 115 U/L
ALT SERPL-CCNC: 11 U/L
ANION GAP SERPL CALC-SCNC: 8 MMOL/L
AST SERPL-CCNC: 16 U/L
BILIRUB SERPL-MCNC: 0.6 MG/DL
BUN SERPL-MCNC: 16 MG/DL
CALCIUM SERPL-MCNC: 10 MG/DL
CHLORIDE SERPL-SCNC: 101 MMOL/L
CO2 SERPL-SCNC: 27 MMOL/L
CREAT SERPL-MCNC: 1.1 MG/DL
EGFR: 71 ML/MIN/1.73M2
GGT SERPL-CCNC: 18 U/L
GLUCOSE SERPL-MCNC: 120 MG/DL
POTASSIUM SERPL-SCNC: 4.6 MMOL/L
PROT SERPL-MCNC: 7.5 G/DL
SODIUM SERPL-SCNC: 136 MMOL/L

## 2023-02-15 ENCOUNTER — APPOINTMENT (OUTPATIENT)
Dept: GASTROENTEROLOGY | Facility: CLINIC | Age: 73
End: 2023-02-15
Payer: COMMERCIAL

## 2023-02-15 DIAGNOSIS — R79.89 OTHER SPECIFIED ABNORMAL FINDINGS OF BLOOD CHEMISTRY: ICD-10-CM

## 2023-02-15 PROCEDURE — 99442: CPT

## 2023-02-15 NOTE — HISTORY OF PRESENT ILLNESS
[Home] : at home, [unfilled] , at the time of the visit. [Medical Office: ([facility])___] : at the medical office located in  [Verbal consent obtained from patient] : the patient, [unfilled] [FreeTextEntry4] : Shaina [FreeTextEntry1] : Patient is a 73 y/o male with PMHx of HTN, ulcerative colitis (, Prior Resection) , cholecystectomy, who presented with elevated blood pressure. Patient had ERCP as found to have abnormal liver function studies. Patient had what appeared to look like an inflammatory stricture, duct irrigated.  CBD brushing without malignant cells.  Since discharge feeling much better.  He notes for return to his appetite, no abdominal pain, no jaundice, and no yellow discoloration of the skin.  Patient follows  for IBD. He had repeat labs which were alll normal CMP and GGT. \par \par

## 2023-02-15 NOTE — ASSESSMENT
[FreeTextEntry1] : Patient is a 71 y/o male with PMHx of HTN, ulcerative colitis (, Prior Resection) , cholecystectomy, who presented with elevated blood pressure. Patient had ERCP as found to have abnormal liver function studies. Patient had what appeared to look like an inflammatory stricture, duct irrigated.  CBD brushing without malignant cells.  Since discharge feeling much better.  He notes for return to his appetite, no abdominal pain, no jaundice, and no yellow discoloration of the skin.  Patient follows  for IBD. He had repeat labs which were alll normal CMP and GGT. \par \par \par Abnormal LFT/ UC Hx\par - Hep Negative\par - ERCP , brushing, no stent placement - Benign epithelial cells\par - MRCP done after ERCP was without ductal abnormalities or findings concerning for PSC\par - LFT 2/23- Normal \par - GGT 2/23 Normal\par - Advised to continue to follow up with GI Dr. Mendoza, follow up as needed with advanced GI

## 2023-08-28 ENCOUNTER — OUTPATIENT (OUTPATIENT)
Dept: OUTPATIENT SERVICES | Facility: HOSPITAL | Age: 73
LOS: 1 days | Discharge: HOME | End: 2023-08-28
Payer: COMMERCIAL

## 2023-08-28 VITALS
RESPIRATION RATE: 17 BRPM | DIASTOLIC BLOOD PRESSURE: 72 MMHG | WEIGHT: 145.06 LBS | HEIGHT: 64 IN | HEART RATE: 61 BPM | TEMPERATURE: 97 F | OXYGEN SATURATION: 98 % | SYSTOLIC BLOOD PRESSURE: 153 MMHG

## 2023-08-28 VITALS — HEART RATE: 62 BPM | SYSTOLIC BLOOD PRESSURE: 163 MMHG | DIASTOLIC BLOOD PRESSURE: 70 MMHG | RESPIRATION RATE: 15 BRPM

## 2023-08-28 DIAGNOSIS — Z87.2 PERSONAL HISTORY OF DISEASES OF THE SKIN AND SUBCUTANEOUS TISSUE: Chronic | ICD-10-CM

## 2023-08-28 DIAGNOSIS — Z90.49 ACQUIRED ABSENCE OF OTHER SPECIFIED PARTS OF DIGESTIVE TRACT: Chronic | ICD-10-CM

## 2023-08-28 DIAGNOSIS — H25.11 AGE-RELATED NUCLEAR CATARACT, RIGHT EYE: ICD-10-CM

## 2023-08-28 PROCEDURE — V2632: CPT

## 2023-08-28 RX ORDER — QUINAPRIL HYDROCHLORIDE 40 MG/1
1 TABLET, FILM COATED ORAL
Qty: 0 | Refills: 0 | DISCHARGE

## 2023-08-28 RX ORDER — AZATHIOPRINE 100 MG/1
1 TABLET ORAL
Qty: 0 | Refills: 0 | DISCHARGE

## 2023-08-28 RX ORDER — ESCITALOPRAM OXALATE 10 MG/1
1 TABLET, FILM COATED ORAL
Qty: 0 | Refills: 0 | DISCHARGE

## 2023-08-28 NOTE — ASU PATIENT PROFILE, ADULT - NSICDXPASTMEDICALHX_GEN_ALL_CORE_FT
PAST MEDICAL HISTORY:  Colitis     GERD (gastroesophageal reflux disease)     HTN (hypertension)     Hyperlipidemia

## 2023-08-28 NOTE — ASU PREOP CHECKLIST - HEIGHT IN CM
normal... Well appearing, well nourished, awake, alert, oriented to person, place, time/situation and in no apparent distress. 162.56

## 2023-08-28 NOTE — ASU DISCHARGE PLAN (ADULT/PEDIATRIC) - NS MD DC FALL RISK RISK
For information on Fall & Injury Prevention, visit: [email or web address] OR  [email or web address] OR  https://www.cdc.gov/steadi/patient.html

## 2023-08-28 NOTE — ASU PATIENT PROFILE, ADULT - FALL HARM RISK - RISK INTERVENTIONS

## 2023-08-30 DIAGNOSIS — Z90.49 ACQUIRED ABSENCE OF OTHER SPECIFIED PARTS OF DIGESTIVE TRACT: ICD-10-CM

## 2023-08-30 DIAGNOSIS — H25.89 OTHER AGE-RELATED CATARACT: ICD-10-CM

## 2023-08-30 DIAGNOSIS — K21.9 GASTRO-ESOPHAGEAL REFLUX DISEASE WITHOUT ESOPHAGITIS: ICD-10-CM

## 2023-08-30 DIAGNOSIS — I10 ESSENTIAL (PRIMARY) HYPERTENSION: ICD-10-CM

## 2023-11-01 PROBLEM — E78.5 HYPERLIPIDEMIA, UNSPECIFIED: Chronic | Status: ACTIVE | Noted: 2023-08-28

## 2023-11-27 ENCOUNTER — OUTPATIENT (OUTPATIENT)
Dept: OUTPATIENT SERVICES | Facility: HOSPITAL | Age: 73
LOS: 1 days | Discharge: HOME | End: 2023-11-27
Payer: COMMERCIAL

## 2023-11-27 VITALS
HEART RATE: 73 BPM | RESPIRATION RATE: 18 BRPM | HEIGHT: 64 IN | DIASTOLIC BLOOD PRESSURE: 74 MMHG | SYSTOLIC BLOOD PRESSURE: 150 MMHG | OXYGEN SATURATION: 98 % | TEMPERATURE: 96 F | WEIGHT: 147.05 LBS

## 2023-11-27 VITALS — RESPIRATION RATE: 17 BRPM | HEART RATE: 69 BPM | SYSTOLIC BLOOD PRESSURE: 130 MMHG | DIASTOLIC BLOOD PRESSURE: 69 MMHG

## 2023-11-27 DIAGNOSIS — Z87.81 PERSONAL HISTORY OF (HEALED) TRAUMATIC FRACTURE: Chronic | ICD-10-CM

## 2023-11-27 DIAGNOSIS — Z90.49 ACQUIRED ABSENCE OF OTHER SPECIFIED PARTS OF DIGESTIVE TRACT: Chronic | ICD-10-CM

## 2023-11-27 DIAGNOSIS — Z87.2 PERSONAL HISTORY OF DISEASES OF THE SKIN AND SUBCUTANEOUS TISSUE: Chronic | ICD-10-CM

## 2023-11-27 DIAGNOSIS — H25.12 AGE-RELATED NUCLEAR CATARACT, LEFT EYE: ICD-10-CM

## 2023-11-27 DIAGNOSIS — Z98.890 OTHER SPECIFIED POSTPROCEDURAL STATES: Chronic | ICD-10-CM

## 2023-11-27 PROCEDURE — V2632: CPT

## 2023-11-27 RX ORDER — ROSUVASTATIN CALCIUM 5 MG/1
1 TABLET ORAL
Qty: 0 | Refills: 0 | DISCHARGE

## 2023-11-27 RX ORDER — MESALAMINE 400 MG
4 TABLET, DELAYED RELEASE (ENTERIC COATED) ORAL
Qty: 0 | Refills: 0 | DISCHARGE

## 2023-11-27 RX ORDER — OLMESARTAN MEDOXOMIL 5 MG/1
1 TABLET, FILM COATED ORAL
Refills: 0 | DISCHARGE

## 2023-11-27 RX ORDER — OMEPRAZOLE 10 MG/1
1 CAPSULE, DELAYED RELEASE ORAL
Qty: 0 | Refills: 0 | DISCHARGE

## 2023-11-27 RX ORDER — METOPROLOL TARTRATE 50 MG
1 TABLET ORAL
Qty: 0 | Refills: 0 | DISCHARGE

## 2023-11-27 NOTE — ASU PATIENT PROFILE, ADULT - FALL HARM RISK - UNIVERSAL INTERVENTIONS
Bed in lowest position, wheels locked, appropriate side rails in place/Call bell, personal items and telephone in reach/Instruct patient to call for assistance before getting out of bed or chair/Non-slip footwear when patient is out of bed/[place] to call system/Physically safe environment - no spills, clutter or unnecessary equipment/Purposeful Proactive Rounding/Room/bathroom lighting operational, light cord in reach
I have personally seen and examined this patient.  I have fully participated in the care of this patient. I have reviewed all pertinent clinical information, including history, physical exam, plan and the Resident’s note and agree except as noted.

## 2023-12-04 DIAGNOSIS — Z90.49 ACQUIRED ABSENCE OF OTHER SPECIFIED PARTS OF DIGESTIVE TRACT: ICD-10-CM

## 2023-12-04 DIAGNOSIS — J45.909 UNSPECIFIED ASTHMA, UNCOMPLICATED: ICD-10-CM

## 2023-12-04 DIAGNOSIS — H26.9 UNSPECIFIED CATARACT: ICD-10-CM

## 2023-12-04 DIAGNOSIS — I10 ESSENTIAL (PRIMARY) HYPERTENSION: ICD-10-CM

## 2023-12-04 DIAGNOSIS — K21.9 GASTRO-ESOPHAGEAL REFLUX DISEASE WITHOUT ESOPHAGITIS: ICD-10-CM

## 2024-02-26 NOTE — ASU PATIENT PROFILE, ADULT - NSICDXPASTSURGICALHX_GEN_ALL_CORE_FT
Detail Level: Detailed Quality 130: Documentation Of Current Medications In The Medical Record: Current Medications Documented PAST SURGICAL HISTORY:  H/O carpal tunnel repair bilateral    H/O pilonidal cyst     History of colon resection     History of fracture of femur 1970    S/P cholecystectomy

## 2025-01-15 NOTE — ASU PREOP CHECKLIST - PATIENT'S PERSONAL PROPERTY GIVEN TO
Quality 226: Preventive Care And Screening: Tobacco Use: Screening And Cessation Intervention: Patient screened for tobacco use and is an ex/non-smoker
3/security/safe
Detail Level: Detailed

## 2025-05-08 NOTE — ASU PATIENT PROFILE, ADULT - TEACHING/LEARNING CULTURAL CONSIDERATIONS
Sera Weaver (:  1956) is a 68 y.o. female,Established patient, here for evaluation of the following chief complaint(s):  Medication Check      Assessment & Plan  KEVIN (generalized anxiety disorder)   Chronic, at goal (stable), continue current treatment plan   -Well-controlled on lorazepam 1 mg 4 times daily as needed.  -Med contract and UDS up-to-date as of 2024.  -PDMP/OARRS reviewed.  -Refill sent to pharmacy.  -Follow-up in 3 months.  Orders:    LORazepam (ATIVAN) 1 MG tablet; TAKE 1 TABLET BY MOUTH FOUR TIMES DAILY AS NEEDED FOR ANXIETY. NO MORE THAN 4 TABLET TOTAL PER DAY    Hypothyroidism, unspecified type   Chronic, at goal (stable), continue current treatment plan  - Will manage a 25 mcg Synthroid daily  -Continue current regimen of half tab daily  -Follow-up annually, or sooner as needed         Return in about 3 months (around 2025) for Controlled Medications.    Subjective       HPI  - rain causing some achiness  - has been taking half of her synthroid  - feeling better on this dose  - taking a thyroid support capsule  - for aches and pains, ibuprofen, cratom  - dog bit her L 2nd finger, improving  - was given Augmentin, helping  - had diarrhea, some dizziness  - better now without Augmentin  - TDaP is up to date  - has been wrapping her finger, less ice  - teeth situation is getting better, glues her teeth in  - continues adderall from psych  - continues lorazepam, 4x daily  - 8p, 5-6a, then 1-2 tabs daily PRN  - stopped medical marijuana, no more marijuana  - cratom instead   - some sciatic pain, thinking about repeat injections       Review of Systems   Constitutional:  Positive for fatigue. Negative for activity change, appetite change, diaphoresis and unexpected weight change.   Eyes:  Negative for photophobia and visual disturbance.   Respiratory:  Negative for cough, chest tightness, shortness of breath and wheezing.    Cardiovascular:  Negative for chest pain,  none